# Patient Record
Sex: FEMALE | Race: WHITE | NOT HISPANIC OR LATINO | Employment: UNEMPLOYED
[De-identification: names, ages, dates, MRNs, and addresses within clinical notes are randomized per-mention and may not be internally consistent; named-entity substitution may affect disease eponyms.]

---

## 2017-04-21 ENCOUNTER — RECORDS - HEALTHEAST (OUTPATIENT)
Dept: ADMINISTRATIVE | Facility: OTHER | Age: 27
End: 2017-04-21

## 2017-06-24 ENCOUNTER — HEALTH MAINTENANCE LETTER (OUTPATIENT)
Age: 27
End: 2017-06-24

## 2017-08-28 ENCOUNTER — AMBULATORY - HEALTHEAST (OUTPATIENT)
Dept: SURGERY | Facility: CLINIC | Age: 27
End: 2017-08-28

## 2017-08-28 ENCOUNTER — OFFICE VISIT - HEALTHEAST (OUTPATIENT)
Dept: SURGERY | Facility: CLINIC | Age: 27
End: 2017-08-28

## 2017-08-28 DIAGNOSIS — E66.01 OBESITY, CLASS III, BMI 40-49.9 (MORBID OBESITY) (H): ICD-10-CM

## 2017-08-28 DIAGNOSIS — F32.A DEPRESSION: ICD-10-CM

## 2017-08-28 ASSESSMENT — MIFFLIN-ST. JEOR: SCORE: 1954.67

## 2017-09-07 ENCOUNTER — OFFICE VISIT - HEALTHEAST (OUTPATIENT)
Dept: FAMILY MEDICINE | Facility: CLINIC | Age: 27
End: 2017-09-07

## 2017-09-07 DIAGNOSIS — E66.01 OBESITY, CLASS III, BMI 40-49.9 (MORBID OBESITY) (H): ICD-10-CM

## 2017-09-07 DIAGNOSIS — Z00.00 HEALTH CARE MAINTENANCE: ICD-10-CM

## 2017-09-07 LAB
CHOLEST SERPL-MCNC: 152 MG/DL
FASTING STATUS PATIENT QL REPORTED: NO
HBA1C MFR BLD: 5.1 % (ref 3.5–6)
HDLC SERPL-MCNC: 36 MG/DL
LDLC SERPL CALC-MCNC: 99 MG/DL
TRIGL SERPL-MCNC: 86 MG/DL

## 2017-09-07 ASSESSMENT — MIFFLIN-ST. JEOR: SCORE: 1949.84

## 2017-09-08 ENCOUNTER — COMMUNICATION - HEALTHEAST (OUTPATIENT)
Dept: FAMILY MEDICINE | Facility: CLINIC | Age: 27
End: 2017-09-08

## 2017-09-08 ENCOUNTER — COMMUNICATION - HEALTHEAST (OUTPATIENT)
Dept: SURGERY | Facility: CLINIC | Age: 27
End: 2017-09-08

## 2017-09-08 DIAGNOSIS — E03.9 HYPOTHYROIDISM: ICD-10-CM

## 2017-09-08 LAB — HBV SURFACE AB SERPL IA-ACNC: POSITIVE M[IU]/ML

## 2017-09-25 ENCOUNTER — OFFICE VISIT - HEALTHEAST (OUTPATIENT)
Dept: SURGERY | Facility: CLINIC | Age: 27
End: 2017-09-25

## 2017-09-25 DIAGNOSIS — Z71.3 NUTRITIONAL COUNSELING: ICD-10-CM

## 2017-09-25 DIAGNOSIS — E66.01 OBESITY, MORBID, BMI 40.0-49.9 (H): ICD-10-CM

## 2017-09-25 ASSESSMENT — MIFFLIN-ST. JEOR: SCORE: 1909.31

## 2017-09-27 ENCOUNTER — RECORDS - HEALTHEAST (OUTPATIENT)
Dept: ADMINISTRATIVE | Facility: OTHER | Age: 27
End: 2017-09-27

## 2017-09-27 ENCOUNTER — OFFICE VISIT - HEALTHEAST (OUTPATIENT)
Dept: FAMILY MEDICINE | Facility: CLINIC | Age: 27
End: 2017-09-27

## 2017-09-27 ENCOUNTER — COMMUNICATION - HEALTHEAST (OUTPATIENT)
Dept: FAMILY MEDICINE | Facility: CLINIC | Age: 27
End: 2017-09-27

## 2017-09-27 DIAGNOSIS — Z11.3 SCREEN FOR STD (SEXUALLY TRANSMITTED DISEASE): ICD-10-CM

## 2017-09-27 DIAGNOSIS — A59.01 TRICHOMONAS VAGINITIS: ICD-10-CM

## 2017-09-27 DIAGNOSIS — N89.8 VAGINAL DISCHARGE: ICD-10-CM

## 2017-09-27 DIAGNOSIS — E03.9 HYPOTHYROIDISM: ICD-10-CM

## 2017-09-27 LAB — HIV 1+2 AB+HIV1 P24 AG SERPL QL IA: NEGATIVE

## 2017-09-28 LAB — SYPHILIS RPR SCREEN - HISTORICAL: NORMAL

## 2017-09-29 ENCOUNTER — AMBULATORY - HEALTHEAST (OUTPATIENT)
Dept: SURGERY | Facility: CLINIC | Age: 27
End: 2017-09-29

## 2017-09-29 ENCOUNTER — COMMUNICATION - HEALTHEAST (OUTPATIENT)
Dept: FAMILY MEDICINE | Facility: CLINIC | Age: 27
End: 2017-09-29

## 2017-10-02 ENCOUNTER — COMMUNICATION - HEALTHEAST (OUTPATIENT)
Dept: SURGERY | Facility: CLINIC | Age: 27
End: 2017-10-02

## 2017-10-02 ENCOUNTER — COMMUNICATION - HEALTHEAST (OUTPATIENT)
Dept: FAMILY MEDICINE | Facility: CLINIC | Age: 27
End: 2017-10-02

## 2017-10-23 ENCOUNTER — COMMUNICATION - HEALTHEAST (OUTPATIENT)
Dept: SURGERY | Facility: CLINIC | Age: 27
End: 2017-10-23

## 2017-11-13 ENCOUNTER — OFFICE VISIT - HEALTHEAST (OUTPATIENT)
Dept: SURGERY | Facility: CLINIC | Age: 27
End: 2017-11-13

## 2017-11-13 DIAGNOSIS — E66.01 OBESITY, CLASS III, BMI 40-49.9 (MORBID OBESITY) (H): ICD-10-CM

## 2017-11-13 ASSESSMENT — MIFFLIN-ST. JEOR: SCORE: 1929.72

## 2017-12-01 ENCOUNTER — OFFICE VISIT - HEALTHEAST (OUTPATIENT)
Dept: SURGERY | Facility: CLINIC | Age: 27
End: 2017-12-01

## 2017-12-01 DIAGNOSIS — Z71.3 NUTRITIONAL COUNSELING: ICD-10-CM

## 2017-12-01 DIAGNOSIS — E66.01 OBESITY, MORBID, BMI 40.0-49.9 (H): ICD-10-CM

## 2017-12-01 ASSESSMENT — MIFFLIN-ST. JEOR: SCORE: 1900.23

## 2017-12-18 ENCOUNTER — OFFICE VISIT - HEALTHEAST (OUTPATIENT)
Dept: SURGERY | Facility: CLINIC | Age: 27
End: 2017-12-18

## 2017-12-18 DIAGNOSIS — K59.00 CONSTIPATION: ICD-10-CM

## 2017-12-18 DIAGNOSIS — E66.01 OBESITY, CLASS III, BMI 40-49.9 (MORBID OBESITY) (H): ICD-10-CM

## 2017-12-18 ASSESSMENT — MIFFLIN-ST. JEOR: SCORE: 1882.09

## 2017-12-21 ENCOUNTER — AMBULATORY - HEALTHEAST (OUTPATIENT)
Dept: SURGERY | Facility: CLINIC | Age: 27
End: 2017-12-21

## 2017-12-21 DIAGNOSIS — E66.01 OBESITY, CLASS III, BMI 40-49.9 (MORBID OBESITY) (H): ICD-10-CM

## 2018-01-05 ENCOUNTER — COMMUNICATION - HEALTHEAST (OUTPATIENT)
Dept: SURGERY | Facility: CLINIC | Age: 28
End: 2018-01-05

## 2018-01-22 ENCOUNTER — OFFICE VISIT - HEALTHEAST (OUTPATIENT)
Dept: SURGERY | Facility: CLINIC | Age: 28
End: 2018-01-22

## 2018-01-22 DIAGNOSIS — R63.4 WEIGHT LOSS OF MORE THAN 10% BODY WEIGHT: ICD-10-CM

## 2018-01-22 DIAGNOSIS — Z71.3 NUTRITIONAL COUNSELING: ICD-10-CM

## 2018-01-22 DIAGNOSIS — R79.89 LOW VITAMIN D LEVEL: ICD-10-CM

## 2018-01-22 DIAGNOSIS — E03.9 HYPOTHYROID: ICD-10-CM

## 2018-01-22 DIAGNOSIS — Z71.3 WEIGHT LOSS COUNSELING, ENCOUNTER FOR: ICD-10-CM

## 2018-01-22 DIAGNOSIS — E66.01 OBESITY, MORBID, BMI 40.0-49.9 (H): ICD-10-CM

## 2018-01-22 ASSESSMENT — MIFFLIN-ST. JEOR
SCORE: 1845.8
SCORE: 1845.8

## 2018-02-14 ENCOUNTER — COMMUNICATION - HEALTHEAST (OUTPATIENT)
Dept: FAMILY MEDICINE | Facility: CLINIC | Age: 28
End: 2018-02-14

## 2018-02-14 ENCOUNTER — OFFICE VISIT - HEALTHEAST (OUTPATIENT)
Dept: FAMILY MEDICINE | Facility: CLINIC | Age: 28
End: 2018-02-14

## 2018-02-14 DIAGNOSIS — Z11.3 SCREEN FOR STD (SEXUALLY TRANSMITTED DISEASE): ICD-10-CM

## 2018-02-14 DIAGNOSIS — R79.89 LOW VITAMIN D LEVEL: ICD-10-CM

## 2018-02-14 DIAGNOSIS — E03.9 HYPOTHYROID: ICD-10-CM

## 2018-02-14 LAB
CLUE CELLS: NORMAL
TRICHOMONAS, WET PREP: NORMAL
TSH SERPL DL<=0.005 MIU/L-ACNC: 3.23 UIU/ML (ref 0.3–5)
YEAST, WET PREP: NORMAL

## 2018-02-14 ASSESSMENT — MIFFLIN-ST. JEOR: SCORE: 1859.41

## 2018-02-15 ENCOUNTER — COMMUNICATION - HEALTHEAST (OUTPATIENT)
Dept: FAMILY MEDICINE | Facility: CLINIC | Age: 28
End: 2018-02-15

## 2018-02-15 ENCOUNTER — COMMUNICATION - HEALTHEAST (OUTPATIENT)
Dept: SURGERY | Facility: CLINIC | Age: 28
End: 2018-02-15

## 2018-02-15 LAB
25(OH)D3 SERPL-MCNC: 24.3 NG/ML (ref 30–80)
C TRACH DNA SPEC QL PROBE+SIG AMP: NEGATIVE
N GONORRHOEA DNA SPEC QL NAA+PROBE: NEGATIVE

## 2018-02-21 ENCOUNTER — HOSPITAL ENCOUNTER (EMERGENCY)
Facility: CLINIC | Age: 28
Discharge: HOME | End: 2018-02-21
Attending: NURSE PRACTITIONER | Admitting: NURSE PRACTITIONER
Payer: COMMERCIAL

## 2018-02-21 VITALS
RESPIRATION RATE: 14 BRPM | OXYGEN SATURATION: 95 % | TEMPERATURE: 98.5 F | HEART RATE: 82 BPM | WEIGHT: 250 LBS | SYSTOLIC BLOOD PRESSURE: 131 MMHG | DIASTOLIC BLOOD PRESSURE: 92 MMHG

## 2018-02-21 DIAGNOSIS — N30.00 ACUTE CYSTITIS WITHOUT HEMATURIA: ICD-10-CM

## 2018-02-21 LAB
ALBUMIN UR-MCNC: NEGATIVE MG/DL
APPEARANCE UR: ABNORMAL
BACTERIA #/AREA URNS HPF: ABNORMAL /HPF
BILIRUB UR QL STRIP: NEGATIVE
COLOR UR AUTO: YELLOW
GLUCOSE UR STRIP-MCNC: NEGATIVE MG/DL
HCG UR QL: NEGATIVE
HGB UR QL STRIP: NEGATIVE
KETONES UR STRIP-MCNC: NEGATIVE MG/DL
LEUKOCYTE ESTERASE UR QL STRIP: ABNORMAL
MUCOUS THREADS #/AREA URNS LPF: PRESENT /LPF
NITRATE UR QL: NEGATIVE
PH UR STRIP: 6 PH (ref 5–7)
RBC #/AREA URNS AUTO: 7 /HPF (ref 0–2)
SOURCE: ABNORMAL
SP GR UR STRIP: 1.02 (ref 1–1.03)
SQUAMOUS #/AREA URNS AUTO: 6 /HPF (ref 0–1)
UROBILINOGEN UR STRIP-MCNC: 0 MG/DL (ref 0–2)
WBC #/AREA URNS AUTO: 177 /HPF (ref 0–2)
WBC CLUMPS #/AREA URNS HPF: PRESENT /HPF

## 2018-02-21 PROCEDURE — 81001 URINALYSIS AUTO W/SCOPE: CPT | Performed by: NURSE PRACTITIONER

## 2018-02-21 PROCEDURE — 87088 URINE BACTERIA CULTURE: CPT | Performed by: NURSE PRACTITIONER

## 2018-02-21 PROCEDURE — G0463 HOSPITAL OUTPT CLINIC VISIT: HCPCS

## 2018-02-21 PROCEDURE — 81025 URINE PREGNANCY TEST: CPT | Performed by: NURSE PRACTITIONER

## 2018-02-21 PROCEDURE — 87186 SC STD MICRODIL/AGAR DIL: CPT | Performed by: NURSE PRACTITIONER

## 2018-02-21 PROCEDURE — 87086 URINE CULTURE/COLONY COUNT: CPT | Performed by: NURSE PRACTITIONER

## 2018-02-21 PROCEDURE — 99213 OFFICE O/P EST LOW 20 MIN: CPT | Performed by: NURSE PRACTITIONER

## 2018-02-21 RX ORDER — SULFAMETHOXAZOLE/TRIMETHOPRIM 800-160 MG
1 TABLET ORAL 2 TIMES DAILY
Qty: 6 TABLET | Refills: 0 | Status: SHIPPED | OUTPATIENT
Start: 2018-02-21 | End: 2018-02-24

## 2018-02-21 NOTE — ED AVS SNAPSHOT
" [facility] Emergency Department    [address]    [facility] [address]    Phone:  [phone]    Fax:  [phone]                                       Stefanie Cordova   MRN: [id number]    Department:  [facility] Emergency Department   Date of Visit:  2/21/2018           Patient Information     Date Of Birth          1990        Your diagnoses for this visit were:     Acute cystitis without hematuria        You were seen by Lavonne Booth APRN CNP.      Follow-up Information     Follow up with Brittany Julien MD.    Specialty:  Pediatrics    Why:  As needed    Contact information:    [address] [place] [address]  [phone]          Discharge Instructions           *BLADDER INFECTION,Female (Adult)    A bladder infection (\"cystitis\" or \"UTI\") usually causes a constant urge to urinate and a burning when passing urine. Urine may be cloudy, smelly or dark. There may be pain in the lower abdomen. A bladder infection occurs when bacteria from the vaginal area enter the bladder opening (urethra). This can occur from sexual intercourse, wearing tight clothing, dehydration and other factors.  HOME CARE:  1. Drink lots of fluids (at least 6-8 glasses a day, unless you must restrict fluids for other medical reasons). This will force the medicine into your urinary system and flush the bacteria out of your body. Cranberry juice has been shown to help clear out the bacteria.  2. Avoid sexual intercourse until your symptoms are gone.  3. A bladder infection is treated with antibiotics. You may also be given Pyridium (generic = phenazopyridine) to reduce the burning sensation. This medicine will cause your urine to become a bright orange color. The orange urine may stain clothing. You may wear a pad or panty-liner to protect clothing.  PREVENTING FUTURE INFECTIONS:  1. Always wipe from front to back after a bowel movement.  2. Keep the genital area clean and " dry.  3. Drink plenty of fluids each day to avoid dehydration.  4. Urinate right after intercourse to flush out the bladder.  5. Wear cotton underwear and cotton-lined panty hose; avoid tight-fitting pants.  6. If you are on birth control pills and are having frequent bladder infections, discuss with your doctor.  FOLLOW UP: Return to this facility or see your doctor if ALL symptoms are not gone after three days of treatment.  GET PROMPT MEDICAL ATTENTION if any of the following occur:    Fever over 101 F (38.3 C)    No improvement by the third day of treatment    Increasing back or abdominal pain    Repeated vomiting; unable to keep medicine down    Weakness, dizziness or fainting    Vaginal discharge    Pain, redness or swelling in the labia (outer vaginal area)    6234-6892 [employer], [address] [place] [address], [place] [address]. All rights reserved. This information is not intended as a substitute for professional medical care. Always follow your healthcare professional's instructions.      24 Hour Appointment Hotline       To make an appointment at any [facility] call [phone] ([phone]). If you don't have a family doctor or clinic, we will help you find one. [facility] are conveniently located to serve the needs of you and your family.             Review of your medicines      START taking        Dose / Directions Last dose taken    sulfamethoxazole-trimethoprim 800-160 MG per tablet   Commonly known as:  BACTRIM DS   Dose:  1 tablet   Quantity:  6 tablet        Take 1 tablet by mouth 2 times daily for 3 days   Refills:  0                Prescriptions were sent or printed at these locations (1 Prescription)                   [facility] [address] [place] [address] - [address]    [address] [facility] [address]    Telephone:  [phone]   Fax:  [phone]   Hours:                  E-Prescribed (1 of 1)         sulfamethoxazole-trimethoprim (BACTRIM DS) 800-160 MG  per tablet                Procedures and tests performed during your visit     HCG qualitative urine    UA with Microscopic reflex to Culture      Orders Needing Specimen Collection     None      Pending Results     Date and Time Order Name Status Description    2/21/2018 1259 UA with Microscopic reflex to Culture In process             Pending Culture Results     Date and Time Order Name Status Description    2/21/2018 1259 UA with Microscopic reflex to Culture In process             Pending Results Instructions     If you had any lab results that were not finalized at the time of your Discharge, you can call the ED Lab Result RN at [phone]. You will be contacted by this team for any positive Lab results or changes in treatment. The nurses are available 7 days a week from 10A to 6:30P.  You can leave a message 24 hours per day and they will return your call.        Test Results From Your Hospital Stay        2/21/2018  1:55 PM      Component Results     Component Value Ref Range & Units Status    Color Urine Yellow  Final    Appearance Urine Slightly Cloudy  Final    Glucose Urine Negative NEG^Negative mg/dL Final    Bilirubin Urine Negative NEG^Negative Final    Ketones Urine Negative NEG^Negative mg/dL Final    Specific Gravity Urine 1.019 1.003 - 1.035 Final    Blood Urine Negative NEG^Negative Final    pH Urine 6.0 5.0 - 7.0 pH Final    Protein Albumin Urine Negative NEG^Negative mg/dL Final    Urobilinogen mg/dL 0.0 0.0 - 2.0 mg/dL Final    Nitrite Urine Negative NEG^Negative Final    Leukocyte Esterase Urine Moderate (A) NEG^Negative Final    Source Midstream Urine  Final    WBC Urine PENDING OTO2^O - 2 /HPF Incomplete    RBC Urine PENDING OTO2^O - 2 /HPF Incomplete         2/21/2018  1:20 PM      Component Results     Component Value Ref Range & Units Status    HCG Qual Urine Negative NEG^Negative Final    This test is for screening purposes.  Results should be interpreted along with   the clinical  "picture.  Confirmation testing is available if warranted by   ordering WQT438, HCG Quantitative Pregnancy.                  Thank you for choosing [facility]       Thank you for choosing [facility] for your care. Our goal is always to provide you with excellent care. Hearing back from our patients is one way we can continue to improve our services. Please take a few minutes to complete the written survey that you may receive in the mail after you visit with us. Thank you!        Acacia Livinghar[employer]ai Information     [employer] lets you send messages to your doctor, view your test results, renew your prescriptions, schedule appointments and more. To sign up, go to [email or web address] . Click on \"Log in\" on the left side of the screen, which will take you to the Welcome page. Then click on \"Sign up Now\" on the right side of the page.     You will be asked to enter the access code listed below, as well as some personal information. Please follow the directions to create your username and password.     Your access code is: [email or web address]  Expires: 2018  2:07 PM     Your access code will  in 90 days. If you need help or a new code, please call your [facility] clinic or [phone].        Care EveryWhere ID     This is your Care EveryWhere ID. This could be used by other organizations to access your [facility] medical records  [id number]        Equal Access to Services     GILL WHITAKER : Hadnegro Johnson, waaxda luqadaha, qaybta kaalmada aderamone, stefany tomas . So [facility] [phone].    ATENCIÓN: Si habla español, tiene a spear disposición servicios gratuitos de asistencia lingüística. Llame al [phone].    We comply with applicable federal civil rights laws and [place] laws. We do not discriminate on the basis of race, color, national origin, age, disability, sex, sexual orientation, or gender identity.            After Visit Summary       This is your record. Keep this with you and " show to your community pharmacist(s) and doctor(s) at your next visit.

## 2018-02-21 NOTE — ED PROVIDER NOTES
History     Chief Complaint   Patient presents with     UTI     concerned for UTI. Burning/itching with urination. Now painful urination.      HPI  Stefanie Cordova is a 27 year old female who presented to urgent care for evaluation of dysuria.  Symptoms started 3 days ago.  Denies fever.  Denies nausea or vomiting.  Denies abdominal pain.  Denies flank pain.  No history of frequent UTIs.  No history of kidney stones.    Problem List:    There are no active problems to display for this patient.       Past Medical History:    History reviewed. No pertinent past medical history.    Past Surgical History:    History reviewed. No pertinent surgical history.    Family History:    No family history on file.    Social History:  Marital Status:  Single [1]  Social History   Substance Use Topics     Smoking status: Never Smoker     Smokeless tobacco: Never Used     Alcohol use Not on file        Medications:      sulfamethoxazole-trimethoprim (BACTRIM DS) 800-160 MG per tablet         Review of Systems  As mentioned above in the history present illness. All other systems were reviewed and are negative.      Physical Exam   BP: (!) 131/92  Pulse: 82  Temp: 98.5  F (36.9  C)  Resp: 14  Weight: 113.4 kg (250 lb)  SpO2: 95 %      Physical Exam    GENERAL APPEARANCE: healthy, alert and no distress  RESP: lungs clear to auscultation - no rales, rhonchi or wheezes  CV: regular rates and rhythm, normal S1 S2, no murmur noted    ED Course     ED Course     Procedures          Results for orders placed or performed during the hospital encounter of 02/21/18 (from the past 48 hour(s))   UA with Microscopic reflex to Culture   Result Value Ref Range    Color Urine Yellow     Appearance Urine Slightly Cloudy     Glucose Urine Negative NEG^Negative mg/dL    Bilirubin Urine Negative NEG^Negative    Ketones Urine Negative NEG^Negative mg/dL    Specific Gravity Urine 1.019 1.003 - 1.035    Blood Urine Negative NEG^Negative    pH Urine 6.0  5.0 - 7.0 pH    Protein Albumin Urine Negative NEG^Negative mg/dL    Urobilinogen mg/dL 0.0 0.0 - 2.0 mg/dL    Nitrite Urine Negative NEG^Negative    Leukocyte Esterase Urine Moderate (A) NEG^Negative    Source Midstream Urine     WBC Urine 177 (H) 0 - 2 /HPF    RBC Urine 7 (H) 0 - 2 /HPF    WBC Clumps Present (A) NEG^Negative /HPF    Bacteria Urine Few (A) NEG^Negative /HPF    Squamous Epithelial /HPF Urine 6 (H) 0 - 1 /HPF    Mucous Urine Present (A) NEG^Negative /LPF   HCG qualitative urine   Result Value Ref Range    HCG Qual Urine Negative NEG^Negative       Assessments & Plan (with Medical Decision Making)       Urinalysis positive for infection.  No signs or symptoms concerning for pyelonephritis or nephrolithiasis at this time.  Patient will be discharged home stable on bactrim pending urine culture results from today's visit.  She was instructed to follow up with PCP if no improvement in three days.  Worrisome reasons to seek care sooner discussed.      I have reviewed the nursing notes.    I have reviewed the findings, diagnosis, plan and need for follow up with the patient.      Discharge Medication List as of 2/21/2018  2:07 PM      START taking these medications    Details   sulfamethoxazole-trimethoprim (BACTRIM DS) 800-160 MG per tablet Take 1 tablet by mouth 2 times daily for 3 days, Disp-6 tablet, R-0, E-Prescribe             Final diagnoses:   Acute cystitis without hematuria       2/21/2018   [facility] EMERGENCY DEPARTMENT     Emmy, SELVIN Villa CNP  02/21/18 1700

## 2018-02-21 NOTE — DISCHARGE INSTRUCTIONS
"    *BLADDER INFECTION,Female (Adult)    A bladder infection (\"cystitis\" or \"UTI\") usually causes a constant urge to urinate and a burning when passing urine. Urine may be cloudy, smelly or dark. There may be pain in the lower abdomen. A bladder infection occurs when bacteria from the vaginal area enter the bladder opening (urethra). This can occur from sexual intercourse, wearing tight clothing, dehydration and other factors.  HOME CARE:  1. Drink lots of fluids (at least 6-8 glasses a day, unless you must restrict fluids for other medical reasons). This will force the medicine into your urinary system and flush the bacteria out of your body. Cranberry juice has been shown to help clear out the bacteria.  2. Avoid sexual intercourse until your symptoms are gone.  3. A bladder infection is treated with antibiotics. You may also be given Pyridium (generic = phenazopyridine) to reduce the burning sensation. This medicine will cause your urine to become a bright orange color. The orange urine may stain clothing. You may wear a pad or panty-liner to protect clothing.  PREVENTING FUTURE INFECTIONS:  1. Always wipe from front to back after a bowel movement.  2. Keep the genital area clean and dry.  3. Drink plenty of fluids each day to avoid dehydration.  4. Urinate right after intercourse to flush out the bladder.  5. Wear cotton underwear and cotton-lined panty hose; avoid tight-fitting pants.  6. If you are on birth control pills and are having frequent bladder infections, discuss with your doctor.  FOLLOW UP: Return to this facility or see your doctor if ALL symptoms are not gone after three days of treatment.  GET PROMPT MEDICAL ATTENTION if any of the following occur:    Fever over 101 F (38.3 C)    No improvement by the third day of treatment    Increasing back or abdominal pain    Repeated vomiting; unable to keep medicine down    Weakness, dizziness or fainting    Vaginal discharge    Pain, redness or swelling in " the labia (outer vaginal area)    3783-9461 [employer], [address] [place] [address], [place] [address]. All rights reserved. This information is not intended as a substitute for professional medical care. Always follow your healthcare professional's instructions.

## 2018-02-21 NOTE — ED NOTES
Patient here for UTI, symptoms started 4 days ago.  Patient presents ambualtory to the urgent care.  UA ordered per protocol.

## 2018-02-21 NOTE — ED AVS SNAPSHOT
[facility] Emergency Department    [address] [address]    Phone:  [phone]    Fax:  [phone]                                       Stefanie Cordova   MRN: [id number]    Department:  [facility] Emergency Department   Date of Visit:  2/21/2018           After Visit Summary Signature Page     I have received my discharge instructions, and my questions have been answered. I have discussed any challenges I see with this plan with the nurse or doctor.    ..........................................................................................................................................  Patient/Patient Representative Signature      ..........................................................................................................................................  Patient Representative Print Name and Relationship to Patient    ..................................................               ................................................  Date                                            Time    ..........................................................................................................................................  Reviewed by Signature/Title    ...................................................              ..............................................  Date                                                            Time

## 2018-02-24 LAB
BACTERIA SPEC CULT: ABNORMAL
BACTERIA SPEC CULT: ABNORMAL
Lab: ABNORMAL
SPECIMEN SOURCE: ABNORMAL

## 2018-09-06 ENCOUNTER — OFFICE VISIT - HEALTHEAST (OUTPATIENT)
Dept: SURGERY | Facility: CLINIC | Age: 28
End: 2018-09-06

## 2018-09-06 DIAGNOSIS — R63.5 WEIGHT GAIN: ICD-10-CM

## 2018-09-06 DIAGNOSIS — R79.89 LOW VITAMIN D LEVEL: ICD-10-CM

## 2018-09-06 DIAGNOSIS — E66.01 MORBID OBESITY WITH BMI OF 50.0-59.9, ADULT (H): ICD-10-CM

## 2018-09-06 DIAGNOSIS — Z56.0 LOSS OF JOB: ICD-10-CM

## 2018-09-06 SDOH — ECONOMIC STABILITY - INCOME SECURITY: UNEMPLOYMENT, UNSPECIFIED: Z56.0

## 2018-09-06 ASSESSMENT — MIFFLIN-ST. JEOR: SCORE: 1990.95

## 2018-09-07 ENCOUNTER — AMBULATORY - HEALTHEAST (OUTPATIENT)
Dept: SURGERY | Facility: CLINIC | Age: 28
End: 2018-09-07

## 2018-09-18 ENCOUNTER — AMBULATORY - HEALTHEAST (OUTPATIENT)
Dept: SURGERY | Facility: CLINIC | Age: 28
End: 2018-09-18

## 2018-09-20 ENCOUNTER — OFFICE VISIT - HEALTHEAST (OUTPATIENT)
Dept: SURGERY | Facility: CLINIC | Age: 28
End: 2018-09-20

## 2018-09-20 DIAGNOSIS — E66.01 MORBID OBESITY WITH BODY MASS INDEX (BMI) OF 50.0 TO 59.9 IN ADULT (H): ICD-10-CM

## 2018-09-27 ENCOUNTER — COMMUNICATION - HEALTHEAST (OUTPATIENT)
Dept: SURGERY | Facility: CLINIC | Age: 28
End: 2018-09-27

## 2018-10-04 ENCOUNTER — OFFICE VISIT - HEALTHEAST (OUTPATIENT)
Dept: SURGERY | Facility: CLINIC | Age: 28
End: 2018-10-04

## 2018-10-04 DIAGNOSIS — E66.01 MORBID OBESITY WITH BMI OF 50.0-59.9, ADULT (H): ICD-10-CM

## 2018-10-09 ENCOUNTER — OFFICE VISIT - HEALTHEAST (OUTPATIENT)
Dept: SURGERY | Facility: CLINIC | Age: 28
End: 2018-10-09

## 2018-10-09 ENCOUNTER — AMBULATORY - HEALTHEAST (OUTPATIENT)
Dept: SURGERY | Facility: CLINIC | Age: 28
End: 2018-10-09

## 2018-10-09 DIAGNOSIS — Z71.3 NUTRITIONAL COUNSELING: ICD-10-CM

## 2018-10-09 DIAGNOSIS — E07.9 THYROID DYSFUNCTION: ICD-10-CM

## 2018-10-09 DIAGNOSIS — E66.01 OBESITY, MORBID, BMI 50 OR HIGHER (H): ICD-10-CM

## 2018-10-09 ASSESSMENT — MIFFLIN-ST. JEOR: SCORE: 2016.81

## 2018-11-05 ENCOUNTER — OFFICE VISIT - HEALTHEAST (OUTPATIENT)
Dept: SURGERY | Facility: CLINIC | Age: 28
End: 2018-11-05

## 2018-11-05 DIAGNOSIS — E07.9 THYROID DYSFUNCTION: ICD-10-CM

## 2018-11-05 DIAGNOSIS — E66.01 OBESITY, MORBID, BMI 50 OR HIGHER (H): ICD-10-CM

## 2018-11-05 DIAGNOSIS — Z71.3 NUTRITIONAL COUNSELING: ICD-10-CM

## 2018-11-05 ASSESSMENT — MIFFLIN-ST. JEOR: SCORE: 1981.88

## 2018-11-08 ENCOUNTER — AMBULATORY - HEALTHEAST (OUTPATIENT)
Dept: LAB | Facility: CLINIC | Age: 28
End: 2018-11-08

## 2018-11-08 ENCOUNTER — OFFICE VISIT - HEALTHEAST (OUTPATIENT)
Dept: SURGERY | Facility: CLINIC | Age: 28
End: 2018-11-08

## 2018-11-08 DIAGNOSIS — E66.01 MORBID OBESITY WITH BMI OF 50.0-59.9, ADULT (H): ICD-10-CM

## 2018-11-08 DIAGNOSIS — Z56.0 LOSS OF JOB: ICD-10-CM

## 2018-11-08 DIAGNOSIS — R79.89 LOW VITAMIN D LEVEL: ICD-10-CM

## 2018-11-08 LAB
ALBUMIN SERPL-MCNC: 3.3 G/DL (ref 3.5–5)
ALP SERPL-CCNC: 66 U/L (ref 45–120)
ALT SERPL W P-5'-P-CCNC: 14 U/L (ref 0–45)
ANION GAP SERPL CALCULATED.3IONS-SCNC: 9 MMOL/L (ref 5–18)
AST SERPL W P-5'-P-CCNC: 15 U/L (ref 0–40)
BILIRUB SERPL-MCNC: 0.3 MG/DL (ref 0–1)
BUN SERPL-MCNC: 12 MG/DL (ref 8–22)
CALCIUM SERPL-MCNC: 9.1 MG/DL (ref 8.5–10.5)
CHLORIDE BLD-SCNC: 111 MMOL/L (ref 98–107)
CHOLEST SERPL-MCNC: 172 MG/DL
CO2 SERPL-SCNC: 22 MMOL/L (ref 22–31)
CREAT SERPL-MCNC: 0.82 MG/DL (ref 0.6–1.1)
ERYTHROCYTE [DISTWIDTH] IN BLOOD BY AUTOMATED COUNT: 12.6 % (ref 11–14.5)
FASTING STATUS PATIENT QL REPORTED: YES
FERRITIN SERPL-MCNC: 21 NG/ML (ref 10–130)
GFR SERPL CREATININE-BSD FRML MDRD: >60 ML/MIN/1.73M2
GLUCOSE BLD-MCNC: 84 MG/DL (ref 70–125)
HBA1C MFR BLD: 5.1 % (ref 3.5–6)
HCT VFR BLD AUTO: 36.6 % (ref 35–47)
HDLC SERPL-MCNC: 43 MG/DL
HGB BLD-MCNC: 12.6 G/DL (ref 12–16)
LDLC SERPL CALC-MCNC: 112 MG/DL
MCH RBC QN AUTO: 28.9 PG (ref 27–34)
MCHC RBC AUTO-ENTMCNC: 34.4 G/DL (ref 32–36)
MCV RBC AUTO: 84 FL (ref 80–100)
PLATELET # BLD AUTO: 349 THOU/UL (ref 140–440)
PMV BLD AUTO: 6.3 FL (ref 7–10)
POTASSIUM BLD-SCNC: 4.5 MMOL/L (ref 3.5–5)
PROT SERPL-MCNC: 6 G/DL (ref 6–8)
RBC # BLD AUTO: 4.35 MILL/UL (ref 3.8–5.4)
SODIUM SERPL-SCNC: 142 MMOL/L (ref 136–145)
TRIGL SERPL-MCNC: 83 MG/DL
TSH SERPL DL<=0.005 MIU/L-ACNC: 5.59 UIU/ML (ref 0.3–5)
VIT B12 SERPL-MCNC: 417 PG/ML (ref 213–816)
WBC: 6.2 THOU/UL (ref 4–11)

## 2018-11-08 SDOH — ECONOMIC STABILITY - INCOME SECURITY: UNEMPLOYMENT, UNSPECIFIED: Z56.0

## 2018-11-08 ASSESSMENT — MIFFLIN-ST. JEOR: SCORE: 1981.88

## 2018-11-09 ENCOUNTER — COMMUNICATION - HEALTHEAST (OUTPATIENT)
Dept: SURGERY | Facility: CLINIC | Age: 28
End: 2018-11-09

## 2018-11-09 DIAGNOSIS — F34.1 DYSTHYMIA: ICD-10-CM

## 2018-11-09 DIAGNOSIS — E03.9 HYPOTHYROID: ICD-10-CM

## 2018-11-09 LAB — 25(OH)D3 SERPL-MCNC: 26.9 NG/ML (ref 30–80)

## 2018-11-13 ENCOUNTER — AMBULATORY - HEALTHEAST (OUTPATIENT)
Dept: SURGERY | Facility: CLINIC | Age: 28
End: 2018-11-13

## 2018-11-16 ENCOUNTER — RECORDS - HEALTHEAST (OUTPATIENT)
Dept: ADMINISTRATIVE | Facility: OTHER | Age: 28
End: 2018-11-16

## 2018-11-16 ENCOUNTER — AMBULATORY - HEALTHEAST (OUTPATIENT)
Dept: SURGERY | Facility: CLINIC | Age: 28
End: 2018-11-16

## 2018-11-16 DIAGNOSIS — K21.9 ACID REFLUX: ICD-10-CM

## 2018-11-16 DIAGNOSIS — Z98.84 S/P BARIATRIC SURGERY: ICD-10-CM

## 2018-11-16 DIAGNOSIS — Z01.818 PRE-OP TESTING: ICD-10-CM

## 2018-11-16 DIAGNOSIS — R63.4 RAPID WEIGHT LOSS: ICD-10-CM

## 2018-11-19 ENCOUNTER — AMBULATORY - HEALTHEAST (OUTPATIENT)
Dept: SURGERY | Facility: CLINIC | Age: 28
End: 2018-11-19

## 2018-12-06 ENCOUNTER — AMBULATORY - HEALTHEAST (OUTPATIENT)
Dept: SURGERY | Facility: CLINIC | Age: 28
End: 2018-12-06

## 2018-12-10 ENCOUNTER — ANESTHESIA - HEALTHEAST (OUTPATIENT)
Dept: SURGERY | Facility: CLINIC | Age: 28
End: 2018-12-10

## 2018-12-11 ENCOUNTER — SURGERY - HEALTHEAST (OUTPATIENT)
Dept: SURGERY | Facility: CLINIC | Age: 28
End: 2018-12-11

## 2018-12-11 ASSESSMENT — MIFFLIN-ST. JEOR
SCORE: 1946.95
SCORE: 1938.79

## 2018-12-13 ENCOUNTER — COMMUNICATION - HEALTHEAST (OUTPATIENT)
Dept: SURGERY | Facility: CLINIC | Age: 28
End: 2018-12-13

## 2018-12-17 ENCOUNTER — AMBULATORY - HEALTHEAST (OUTPATIENT)
Dept: SURGERY | Facility: CLINIC | Age: 28
End: 2018-12-17

## 2018-12-17 DIAGNOSIS — Z71.3 DIETARY COUNSELING: ICD-10-CM

## 2018-12-17 DIAGNOSIS — Z98.84 S/P BARIATRIC SURGERY: ICD-10-CM

## 2018-12-17 DIAGNOSIS — E66.01 OBESITY, MORBID, BMI 40.0-49.9 (H): ICD-10-CM

## 2018-12-27 ENCOUNTER — OFFICE VISIT - HEALTHEAST (OUTPATIENT)
Dept: SURGERY | Facility: CLINIC | Age: 28
End: 2018-12-27

## 2018-12-27 DIAGNOSIS — Z98.84 S/P BARIATRIC SURGERY: ICD-10-CM

## 2018-12-27 ASSESSMENT — MIFFLIN-ST. JEOR: SCORE: 1907.04

## 2019-06-28 ENCOUNTER — COMMUNICATION - HEALTHEAST (OUTPATIENT)
Dept: SURGERY | Facility: CLINIC | Age: 29
End: 2019-06-28

## 2019-06-28 DIAGNOSIS — Z98.84 S/P BARIATRIC SURGERY: ICD-10-CM

## 2019-06-28 DIAGNOSIS — K90.9 INTESTINAL MALABSORPTION, UNSPECIFIED: ICD-10-CM

## 2019-06-28 DIAGNOSIS — K91.2 POSTSURGICAL MALABSORPTION: ICD-10-CM

## 2019-07-10 ENCOUNTER — COMMUNICATION - HEALTHEAST (OUTPATIENT)
Dept: SURGERY | Facility: CLINIC | Age: 29
End: 2019-07-10

## 2019-11-24 ENCOUNTER — HOSPITAL ENCOUNTER (EMERGENCY)
Facility: CLINIC | Age: 29
Discharge: HOME | End: 2019-11-24
Attending: PSYCHIATRY & NEUROLOGY | Admitting: PSYCHIATRY & NEUROLOGY
Payer: COMMERCIAL

## 2019-11-24 VITALS
HEART RATE: 72 BPM | SYSTOLIC BLOOD PRESSURE: 136 MMHG | DIASTOLIC BLOOD PRESSURE: 85 MMHG | OXYGEN SATURATION: 100 % | BODY MASS INDEX: 32.71 KG/M2 | TEMPERATURE: 97.6 F | RESPIRATION RATE: 18 BRPM | WEIGHT: 191.6 LBS | HEIGHT: 64 IN

## 2019-11-24 DIAGNOSIS — F19.10 POLYSUBSTANCE ABUSE (H): ICD-10-CM

## 2019-11-24 DIAGNOSIS — Z59.00 HOMELESS: ICD-10-CM

## 2019-11-24 LAB
AMPHETAMINES UR QL SCN: POSITIVE
BARBITURATES UR QL: NEGATIVE
BENZODIAZ UR QL: NEGATIVE
CANNABINOIDS UR QL SCN: POSITIVE
COCAINE UR QL: NEGATIVE
ETHANOL UR QL SCN: NEGATIVE
HCG UR QL: NEGATIVE
OPIATES UR QL SCN: NEGATIVE

## 2019-11-24 PROCEDURE — 81025 URINE PREGNANCY TEST: CPT | Performed by: PSYCHIATRY & NEUROLOGY

## 2019-11-24 PROCEDURE — 90791 PSYCH DIAGNOSTIC EVALUATION: CPT

## 2019-11-24 PROCEDURE — 80320 DRUG SCREEN QUANTALCOHOLS: CPT | Performed by: PSYCHIATRY & NEUROLOGY

## 2019-11-24 PROCEDURE — 99284 EMERGENCY DEPT VISIT MOD MDM: CPT | Mod: Z6 | Performed by: PSYCHIATRY & NEUROLOGY

## 2019-11-24 PROCEDURE — 80307 DRUG TEST PRSMV CHEM ANLYZR: CPT | Performed by: PSYCHIATRY & NEUROLOGY

## 2019-11-24 PROCEDURE — 99285 EMERGENCY DEPT VISIT HI MDM: CPT | Mod: 25 | Performed by: PSYCHIATRY & NEUROLOGY

## 2019-11-24 SDOH — ECONOMIC STABILITY - HOUSING INSECURITY: HOMELESSNESS UNSPECIFIED: Z59.00

## 2019-11-24 ASSESSMENT — ENCOUNTER SYMPTOMS
DECREASED CONCENTRATION: 1
ACTIVITY CHANGE: 1
GASTROINTESTINAL NEGATIVE: 1
RESPIRATORY NEGATIVE: 1
CARDIOVASCULAR NEGATIVE: 1
HYPERACTIVE: 0
NERVOUS/ANXIOUS: 1
SLEEP DISTURBANCE: 1
MUSCULOSKELETAL NEGATIVE: 1
HALLUCINATIONS: 0
NEUROLOGICAL NEGATIVE: 1

## 2019-11-24 ASSESSMENT — MIFFLIN-ST. JEOR: SCORE: 1584.09

## 2019-11-24 NOTE — ED AVS SNAPSHOT
[facility] [place], Emergency Department  [address]  [facility] [address]  Phone:  [phone]  Fax:  [phone]                                    Stefanie Cordova   MRN: [id number]    Department:  [facility], Emergency Department   Date of Visit:  11/24/2019           After Visit Summary Signature Page    I have received my discharge instructions, and my questions have been answered. I have discussed any challenges I see with this plan with the nurse or doctor.    ..........................................................................................................................................  Patient/Patient Representative Signature      ..........................................................................................................................................  Patient Representative Print Name and Relationship to Patient    ..................................................               ................................................  Date                                   Time    ..........................................................................................................................................  Reviewed by Signature/Title    ...................................................              ..............................................  Date                                               Time          22EPIC Rev 08/18

## 2019-11-25 NOTE — ED TRIAGE NOTES
Marijuana and Adderrall abuse.  Last use two days ago.  Pt here with SO that is also seeking substance help.

## 2019-11-25 NOTE — ED PROVIDER NOTES
History     Chief Complaint   Patient presents with     Addiction Problem     Substance abuse, uses marijuana and Adderrall.  Last use two days ago.     The history is provided by the patient.     Stefanie Cordova is a 29 year old female who is here accompanied by mother and brother. Patient reports abusing Adderall past several months and it has lead to problems with her family. She has burned all her bridges and now is homeless. She and her boyfriend have been staying on the streets or shelters. She is tired of being homeless. She is interested in getting into treatment. She reports history of being in CD treatment as an adolescent. She reports having hallucinations when she abuses Adderall. She presently does not exhibit psychosis or paranoia. There is no suicidal thinking.    Patient was [facility] [place] [facility] [place] for URI concern. She has no further concerns regarding this.    Please see DEC Crisis Assessment on 11/24/19 in Epic for further details.    PERSONAL MEDICAL HISTORY  History reviewed. No pertinent past medical history.  PAST SURGICAL HISTORY  Past Surgical History:   Procedure Laterality Date     GI SURGERY  12/11/2018    Gastric sleeve     GYN SURGERY  03/19/2015    Tubal     FAMILY HISTORY  History reviewed. No pertinent family history.  SOCIAL HISTORY  Social History     Tobacco Use     Smoking status: Current Every Day Smoker     Packs/day: 0.50     Smokeless tobacco: Never Used   Substance Use Topics     Alcohol use: Yes     Comment: rarely     MEDICATIONS  No current facility-administered medications for this encounter.      No current outpatient medications on file.     ALLERGIES  No Known Allergies      I have reviewed the Medications, Allergies, Past Medical and Surgical History, and Social History in the Epic system.    Review of Systems   Constitutional: Positive for activity change.   HENT: Negative.    Respiratory: Negative.    Cardiovascular: Negative.    Gastrointestinal: Negative.   "  Genitourinary: Negative.    Musculoskeletal: Negative.    Neurological: Negative.    Psychiatric/Behavioral: Positive for behavioral problems, decreased concentration and sleep disturbance. Negative for hallucinations and suicidal ideas. The patient is nervous/anxious. The patient is not hyperactive.    All other systems reviewed and are negative.      Physical Exam   BP: (!) 138/93  Pulse: 84  Temp: 97.6  F (36.4  C)  Resp: 18  Height: 162.6 cm (5' 4\")  Weight: 86.9 kg (191 lb 9.6 oz)  SpO2: 100 %      Physical Exam  Vitals signs and nursing note reviewed.   Constitutional:       Appearance: Normal appearance.   HENT:      Head: Normocephalic and atraumatic.      Nose: Nose normal.      Mouth/Throat:      Mouth: Mucous membranes are moist.   Eyes:      Pupils: Pupils are equal, round, and reactive to light.   Neck:      Musculoskeletal: Normal range of motion.   Cardiovascular:      Rate and Rhythm: Normal rate and regular rhythm.   Pulmonary:      Effort: Pulmonary effort is normal.      Breath sounds: Normal breath sounds.   Abdominal:      General: Abdomen is flat.   Musculoskeletal: Normal range of motion.   Skin:     General: Skin is warm.   Neurological:      General: No focal deficit present.      Mental Status: She is alert and oriented to person, place, and time.   Psychiatric:         Attention and Perception: Attention and perception normal.         Mood and Affect: Mood and affect normal.         Speech: Speech normal.         Behavior: Behavior normal. Behavior is not agitated, aggressive, hyperactive or combative. Behavior is cooperative.         Thought Content: Thought content normal. Thought content is not paranoid or delusional. Thought content does not include homicidal or suicidal ideation.         Cognition and Memory: Cognition and memory normal.         Judgment: Judgment normal.         ED Course        Procedures               Labs Ordered and Resulted from Time of ED Arrival Up to the " Time of Departure from the ED   DRUG ABUSE SCREEN 6 CHEM DEP URINE ([facility])   HCG QUALITATIVE URINE   DRUG ABUSE SCREEN 6 CHEM DEP URINE ([facility])   HCG QUALITATIVE URINE            Assessments & Plan (with Medical Decision Making)   Patient with polysubstance abuse who currently is homeless. Family was hoping that patient can be admitted to an inpatient CD treatment program tonight. This is not possible. [facility] was able to make an appointment for a CD assessment through [facility] for Tuesday 11/26/19 at 10:30 AM. I recommended to mother that perhaps she can stay with her until she gets into treatment. This was not possible. The  called the homeless shelter for availability and she already has a reservation.     I have reviewed the nursing notes.    I have reviewed the findings, diagnosis, plan and need for follow up with the patient.    New Prescriptions    No medications on file       Final diagnoses:   Polysubstance abuse (H)   Homeless       11/24/2019   [facility], [place], EMERGENCY DEPARTMENT     Jason Turpin MD  11/24/19 5716

## 2019-11-25 NOTE — DISCHARGE INSTRUCTIONS
I am glad you are taking steps to manage your drug abuse problem. Please follow-up with referred scheduled appointment at [facility] on Tuesday 10:30 AM for further CD evaluation and treatment recommendations.

## 2020-06-03 ENCOUNTER — OFFICE VISIT - HEALTHEAST (OUTPATIENT)
Dept: SURGERY | Facility: CLINIC | Age: 30
End: 2020-06-03

## 2020-09-11 ENCOUNTER — OFFICE VISIT - HEALTHEAST (OUTPATIENT)
Dept: SURGERY | Facility: CLINIC | Age: 30
End: 2020-09-11

## 2020-09-11 DIAGNOSIS — K91.2 POSTOPERATIVE MALABSORPTION: ICD-10-CM

## 2020-09-11 ASSESSMENT — MIFFLIN-ST. JEOR: SCORE: 1455.71

## 2020-09-23 ENCOUNTER — OFFICE VISIT - HEALTHEAST (OUTPATIENT)
Dept: SURGERY | Facility: CLINIC | Age: 30
End: 2020-09-23

## 2020-09-23 DIAGNOSIS — Z98.84 BARIATRIC SURGERY STATUS: ICD-10-CM

## 2020-09-24 ENCOUNTER — OFFICE VISIT - HEALTHEAST (OUTPATIENT)
Dept: SURGERY | Facility: CLINIC | Age: 30
End: 2020-09-24

## 2020-09-24 ENCOUNTER — AMBULATORY - HEALTHEAST (OUTPATIENT)
Dept: LAB | Facility: CLINIC | Age: 30
End: 2020-09-24

## 2020-09-24 DIAGNOSIS — K91.2 POSTOPERATIVE MALABSORPTION: ICD-10-CM

## 2020-09-24 LAB
CHOLEST SERPL-MCNC: 153 MG/DL
ERYTHROCYTE [DISTWIDTH] IN BLOOD BY AUTOMATED COUNT: 13.1 % (ref 11–14.5)
FASTING STATUS PATIENT QL REPORTED: YES
FOLATE SERPL-MCNC: 10.8 NG/ML
HBA1C MFR BLD: 4.8 %
HCT VFR BLD AUTO: 35.9 % (ref 35–47)
HDLC SERPL-MCNC: 47 MG/DL
HGB BLD-MCNC: 12.1 G/DL (ref 12–16)
LDLC SERPL CALC-MCNC: 95 MG/DL
MCH RBC QN AUTO: 28.3 PG (ref 27–34)
MCHC RBC AUTO-ENTMCNC: 33.8 G/DL (ref 32–36)
MCV RBC AUTO: 84 FL (ref 80–100)
PLATELET # BLD AUTO: 298 THOU/UL (ref 140–440)
PMV BLD AUTO: 7.4 FL (ref 7–10)
PTH-INTACT SERPL-MCNC: 64 PG/ML (ref 10–86)
RBC # BLD AUTO: 4.28 MILL/UL (ref 3.8–5.4)
TRIGL SERPL-MCNC: 54 MG/DL
VIT B12 SERPL-MCNC: 384 PG/ML (ref 213–816)
WBC: 4.4 THOU/UL (ref 4–11)

## 2020-09-24 ASSESSMENT — MIFFLIN-ST. JEOR: SCORE: 1496.53

## 2020-09-25 LAB — 25(OH)D3 SERPL-MCNC: 34.1 NG/ML (ref 30–80)

## 2020-09-27 LAB
ANNOTATION COMMENT IMP: NORMAL
VIT A SERPL-MCNC: 0.41 MG/L (ref 0.3–1.2)
VITAMIN A (RETINYL PALMITATE): 0.04 MG/L (ref 0–0.1)
ZINC SERPL-MCNC: 73.4 UG/DL (ref 60–120)

## 2020-09-28 LAB — VIT B1 PYROPHOSHATE BLD-SCNC: 113 NMOL/L (ref 70–180)

## 2021-05-30 NOTE — TELEPHONE ENCOUNTER
"Pt is 6 months s/p LSG and called in with complaints of nausea and abdominal pain on her left side.  She says it feels like heartburn but it's not in her epigastric area.  Pt denies any GI insults but says she \"probably isn't eating right or getting enough fluids in\".  She says that has only very recently started taking her vitamins again and has \"a lot going on\" in her life right now and is trying to get back on track.  Pt has not been in for post op f/u since her 2 week post op with .  We discussed getting in sooner than the appointment that was made for her in August and she will see  and the dietitian on July 10th.  She was encouraged to increase her water intake, take her vitamins and start taking the script for Omeprazole that she has at home but hasn't been taking.  She is seeing her PCP today and would like lab orders sent there so that she can do her 6 month post op labs.  Pt was encouraged to call with any further questions or concerns and she verbalized understanding.    Rani John RN, CBN  [facility]  P [phone]  F [phone]    "

## 2021-05-31 VITALS — WEIGHT: 255 LBS | BODY MASS INDEX: 45.18 KG/M2 | HEIGHT: 63 IN

## 2021-05-31 VITALS — BODY MASS INDEX: 47.66 KG/M2 | WEIGHT: 269 LBS | HEIGHT: 63 IN

## 2021-05-31 VITALS — WEIGHT: 273.5 LBS | HEIGHT: 63 IN | BODY MASS INDEX: 48.46 KG/M2

## 2021-05-31 VITALS — WEIGHT: 263 LBS | BODY MASS INDEX: 46.6 KG/M2 | HEIGHT: 63 IN

## 2021-05-31 VITALS — BODY MASS INDEX: 47.31 KG/M2 | WEIGHT: 267 LBS | HEIGHT: 63 IN

## 2021-05-31 VITALS — HEIGHT: 63 IN | BODY MASS INDEX: 49.43 KG/M2 | WEIGHT: 279 LBS

## 2021-05-31 VITALS — BODY MASS INDEX: 45.18 KG/M2 | HEIGHT: 63 IN | WEIGHT: 255 LBS

## 2021-05-31 VITALS — BODY MASS INDEX: 49.09 KG/M2 | WEIGHT: 277.06 LBS | HEIGHT: 63 IN

## 2021-05-31 VITALS — BODY MASS INDEX: 47.53 KG/M2 | WEIGHT: 268.31 LBS

## 2021-06-01 VITALS — BODY MASS INDEX: 45.71 KG/M2 | WEIGHT: 258 LBS | HEIGHT: 63 IN

## 2021-06-01 VITALS — WEIGHT: 287 LBS | BODY MASS INDEX: 50.85 KG/M2 | HEIGHT: 63 IN

## 2021-06-02 VITALS — WEIGHT: 293 LBS | BODY MASS INDEX: 51.9 KG/M2

## 2021-06-02 VITALS — WEIGHT: 291 LBS | BODY MASS INDEX: 51.55 KG/M2

## 2021-06-02 VITALS — WEIGHT: 285 LBS | BODY MASS INDEX: 50.5 KG/M2 | HEIGHT: 63 IN

## 2021-06-02 VITALS — WEIGHT: 292.7 LBS | HEIGHT: 63 IN | BODY MASS INDEX: 51.86 KG/M2

## 2021-06-02 VITALS — HEIGHT: 63 IN | BODY MASS INDEX: 49.13 KG/M2 | WEIGHT: 277.3 LBS

## 2021-06-02 VITALS — WEIGHT: 268.5 LBS | HEIGHT: 63 IN | BODY MASS INDEX: 47.57 KG/M2

## 2021-06-02 VITALS — BODY MASS INDEX: 50.5 KG/M2 | HEIGHT: 63 IN | WEIGHT: 285 LBS

## 2021-06-02 VITALS — BODY MASS INDEX: 50.66 KG/M2 | WEIGHT: 286 LBS

## 2021-06-04 VITALS — WEIGHT: 169 LBS | BODY MASS INDEX: 29.95 KG/M2 | HEIGHT: 63 IN

## 2021-06-05 VITALS
DIASTOLIC BLOOD PRESSURE: 62 MMHG | HEIGHT: 63 IN | BODY MASS INDEX: 31.54 KG/M2 | SYSTOLIC BLOOD PRESSURE: 102 MMHG | WEIGHT: 178 LBS

## 2021-06-11 NOTE — PROGRESS NOTES
"    Stefanie Cordova is a 30 y.o. female who is being evaluated via a billable telephone visit.      The patient has been notified of following:     \"This telephone visit will be conducted via a call between you and your physician/provider. We have found that certain health care needs can be provided without the need for a physical exam.  This service lets us provide the care you need with a short phone conversation.  If a prescription is necessary we can send it directly to your pharmacy.  If lab work is needed we can place an order for that and you can then stop by our lab to have the test done at a later time.    If during the course of the call the physician/provider feels a telephone visit is not appropriate, you will not be charged for this service.\"     Stefanie Cordova complains of    Chief Complaint   Patient presents with     Nutrition Counseling       I have reviewed and updated the patient's Past Medical History, Social History, Family History and Medication List.    ALLERGIES  Patient has no known allergies.    Additional provider notes:     Post-op Surgical Weight Loss Diet Evaluation     Assessment:  Pt presents for 1 year post-op RD visit, s/p LSG on 12/11/18 with Dr. Mccollum. Today we reviewed current eating habits and level of physical activity, and instructed on the changes that are required for successful bariatric outcomes.    Patient Progress: Patient has maintained 110 lb weight loss since surgery.    Pt's Initial Weight: 279 lbs  Weight: 169 lb (76.7 kg)  Weight loss from initial: 110  % Weight loss: 39.43 %      There is no height or weight on file to calculate BMI.     Concerns: Patient does not have any questions or concerns    Vitamins   Multi Vit with Iron: yes  Calcium Citrate: yes  B12: yes  D3: yes    Do you experience hunger? Some times   Do you experience any reflux or discomfort with eating? Dairy - nauseous, carbs - \"too hard on stomach\"  Nausea: no  Vomiting: no  Diarrhea: " "no  Constipation: no  Hair loss:no    Diet Recall/Time:   Eats about 1-2 meals per day  Breakfast: Protein shake  Pm snack: protein shake  Dinner: chicken sandwich    Fluid-meal separation:  Fluids are  30min before and 30 minutes after meals.    Fluid Intake  Water: 3 bottles  Carbonation: every so often of soda  Juice: occasionally    Exercise: none      PES statement:      (NC-1.4) Altered GI Function related to Alteration in gastrointestinal tract structure and/or function/ Decreased functional length of the GI tract as evidenced by Weight loss of 39.4% initial body weight; sleeve gastrectomy    Intervention    Discussion  1. Discussed Post-Op Nutritional Guidelines for LSG  2. Recommended to consume 15-20gm protein at 3 meals daily, along with protein supplement/\"planned protein containing snack\" of 15-30gm protein, to reach goal of 60-80 gm protein daily.  3. Educated on post-op vitamin regimen: Multi Vit + iron 2x/day, calcium citrate 400-600 mg 2x/day, 9097-6843 mcg of Sublingual B-12 daily, and 5000 IU Vitamin D3 daily (MVI and calcium can be taken at the same time BID)    Instructions  1. Include 15-20gm protein at each meal, along with protein supplement/\"planned protein containing snack\" of 15-30gm protein, to reach goal of 60-80 gm protein daily.  2. Increase fluid intake to 64oz daily: choose plain or calorie/carbonation-free beverages.  3. Incorporate daily structured activity, 30-60 minutes most days of the week  4. Recommended pt to start taking: Multi Vit + iron 2x/day, calcium citrate 400-600 mg 2x/day, 7565-6037 mcg of Sublingual B-12 daily, and 5000 IU Vitamin D3 daily. (MVI and calcium can be taken at the same time)  5. Separate fluids 30 minutes before/after meal times.      Assessment/Plan:  1. Bariatric surgery status    Pt to follow up for 2 year  post-op visit with bariatrician     Phone call duration: 9 minutes    Dipti Leonardo RD      "

## 2021-06-11 NOTE — PATIENT INSTRUCTIONS - HE
[facility]  Nutritional Guidelines  Gastric Sleeve 18 Months Post Op and Beyond    General Guidelines and Helpful Hints:    Eat 3 meals per day + protein supplement(s). No snacks between meals.  o Do not skip meals.  This can cause overeating at the next meal and will prevent adequate protein and nutritional intake.    Aim for 60-80 grams of protein per day.  o Always eat your protein first. This assists with optimal nutrition and helps you stay full longer.  o Depending on your portion size, you may need to drink approved protein supplement between meals to achieve protein goals. Follow recommendations of your Dietitian.     Eat your protein first, and then follow with fiber.   o It is not necessary to count your fiber, but 15-20 grams per day is recommended.    o Add fiber by including fruits, vegetables, whole grains, and beans.     Portions should remain about 1 cup per meal. Use measuring cups to be accurate.    Continue to use saucer/salad plates, infant/toddler silverware to keep portion sizes small and take small bites.    Eat S-L-O-W-L-Y to make each meal last 20-30 minutes. Always stop eating when satisfied.    Continue to use caution with foods containing skins, peels or membranes. Chew well!    Aim for 64 oz. of calorie-free fluids daily.  o Continue to avoid caffeine and carbonation. If you choose to drink alcohol, do so in moderation.   o Remember to avoid drinking during meals, 15-30 minutes before and 30 minutes after.    Exercise is zafar for continued weight loss and weight maintenance. Aim for 30-60 minutes of physical activity most days of the week. Include cardiovascular and strength training.    If having trouble tolerating meat, try using a crock-pot, tinfoil tent, steamer or other moist cooking method to create tender meats. Add broth or low-fat gravy to help meat stay moist.     Avoid high sugar and high fat foods to prevent high calorie intakes and a reduced rate of weight  loss, or weight regain.  o Check nutrition labels for less than 10 grams of sugar and less than 10 grams of fat per serving.    Continue Taking Vitamins/Minerals:  o 0915-6509 mcg of Sublingual B-12 daily  o 1 Multivitamin with Iron twice daily (chewable or swallow tabs)  o 500-600 mg Calcium Citrate twice daily (chewable or swallow tabs)  o 5000 IU Vitamin D3 daily    Sample Grocery List    Protein:    Fat free Greek or light yogurt (less than 10 grams sugar)    Fat free or low-fat cottage cheese    String cheese or reduced fat cheese slices    Tuna, salmon, crab, egg, or chicken salad made with light or fat free mayonnaise    Egg or Egg Substitute    Lean/extra lean turkey, beef, bison, venison (ground, sirloin, round, flank)    Pork loin or tenderloin (grilled, baked, broiled)    Fish such as salmon, tuna, trout, tilapia, etc. (grilled, baked, broiled)    Tender cuts of lean (skinless) turkey or chicken    Lean deli meats: turkey, lean ham, chicken, lean roast beef    Beans such as kidney, garbanzo, black, tinajero, or low-fat/fat free refried beans    Peanut butter (natural preferred). Limit to 1 Tbsp. per day.    Low-fat meatloaf (made with lean ground beef or turkey)    Sloppy Joes made with low-sugar ketchup and lean ground beef or turkey    Soy or vegetable protein (i.e. vegan crumbles, soy/veggie burger, tofu)    Hummus    Vegetables:    Fresh: cooked or raw (as tolerated)    Frozen vegetables    Canned vegetables (low sodium or no salt added, rinse before cooking/eating)    (Ok to have skins/peels/membranes/seeds - just chew well)    Fruits:    Fresh fruit    Frozen fruit (no sugar added)    Canned fruit (packed in its own juice, NOT syrup)    (Ok to have skins/peels/membranes/seeds - just chew well)    Starch:    Unsweetened whole-grain hot cereal (or high fiber cold cereal, dry)    Toasted whole wheat bread or [place] Thins    Whole grain crackers    Baked /boiled/mashed potato/sweet potato    Cooked whole  grain pasta, brown rice, or other cooked whole grains    Starchy vegetables: corn, peas, winter squash    Protein Supplement:     Ready to drink protein shake with:  o 15-30 grams protein per serving  o Less than 10 grams total carbohydrate per serving     Protein powder mixed with:  o  Skim or 1% milk  o Low fat or fat free Lactaid milk, plain or no sugar added soymilk  o Water     Fats: (use in moderation)    1 teaspoon of soft tub margarine    1 teaspoon olive oil, canola oil, or peanut oil    1 tablespoon of low-fat ofntanez or salad dressing     Sample Menu for 18+ months after Gastric Sleeve    You do NOT need to eat/drink the full portion sizes listed below  Always stop when you are satisfied    Breakfast   cup 1% cottage cheese     cup mixed berries   Lunch 2 oz lean roast beef on   [place] Thin with 1 tsp. light fontanez    small tomato, chopped, mixed with 1 tsp. light vinaigrette dressing   Supplement Approved protein supplement (if needed between meals)   Dinner 2 oz grilled salmon    cup salad greens with 1 tsp. light salad dressing and 1 tsp. ground flax seed    cup quinoa or brown rice     Breakfast   cup egg substitute with   cup sautéed chopped vegetables  2 light [place] Krisp crackers   Lunch Tuna Melt:   cup tuna mixed with 1 tsp. light fontanez over   [place] Thin. Top with 2-3 slices cucumber and 1 oz slice of low fat cheese   Supplement 1 cup skim milk (if needed between meals)   Dinner 3 oz  grilled, broiled, or baked seasoned skinless chicken breast    cup asparagus     Breakfast   cup plain oatmeal made with skim or 1% milk with 1 Tbsp. flavored/unflavored protein powder added  1 mozzarella string cheese   Lunch 2 oz deli turkey breast  1/3 cup salad with 1 tsp. light salad dressing, 1/8 of a whole avocado and 1 Tbsp. sunflower seeds   Dinner 3 oz. pork loin made in a crock pot, seasoned with a spice rub    cup cooked carrots   Supplement Approved protein supplement (if needed between meals)      Breakfast 1 cup breakfast casserole made with egg substitute, turkey sausage,  and steamed, chopped bell peppers   Supplement  1 cup light Greek yogurt (if needed between meals)   Lunch 2 oz. teriyaki turkey    cup mashed sweet potato with 1-2 spritzes of spray butter (like [place])    cup fresh pineapple   Dinner 3 oz low fat meatloaf    cup roasted garlic zucchini     Breakfast   cup leftover breakfast casserole    cup no sugar added applesauce with 1 Tbsp. unflavored protein powder and a sprinkle of cinnamon    Lunch 3 oz shrimp with 1-2 Tbsp. low-sugar cocktail sauce for dipping    c. whole wheat pasta drizzled with   tsp. olive oil   Supplement 1 cup skim/1% milk with scoop of protein powder (if needed between meals)   Dinner Grilled, seasoned kebob with 2 oz lean beef and   cup vegetables     Breakfast Breakfast pizza:   [place] Thin spread with 1 Tbsp. low sugar spaghetti sauce,   cup shredded low fat cheese, melted and 1 slice of [place] blankenship     cup fresh fruit mixed with chopped almonds   Lunch   cup black bean soup  4-5 whole grain crackers   Dinner 3 oz  tilapia with lemon pepper seasoning    cup stewed tomatoes   Supplement 1 string cheese (if needed between meals)     Breakfast 2 hard boiled eggs (discard 1 egg yolk)    whole wheat English Muffin with 1 tsp. low sugar jelly   Lunch   cup leftover black bean soup topped with 1-2 Tbsp. low fat cheese  2-3 light [place] Krisp crackers   Supplement Approved protein supplement (if needed between meals)   Dinner 3 oz sirloin steak    cup steamed broccoli

## 2021-06-11 NOTE — PROGRESS NOTES
Bariatric Follow Up Visit with a History of Previous Bariatric Surgery     Date of visit: 9/24/2020  Physician: Lilo Altman MD  Primary Care Provider:  Jovanna Yanez MD Ashley A Pacheco   30 y.o.  female    Date of Surgery: 12/11/2018  Initial Weight: 279  Initial BMI: 46.43  Today's Weight:   Wt Readings from Last 1 Encounters:   09/24/20 178 lb (80.7 kg)     Body mass index is 31.53 kg/m .      Assessment and Plan     Assessment: Stefanie is a 30 y.o. year old female who is almost 2 yrs s/p  Sleeve Gastrectomy with Dr. Veda Cordova feels as if she has achieved the goals she hoped to accomplish through bariatric surgery and weight loss.    Encounter Diagnosis   Name Primary?     Postoperative malabsorption Yes         Current Outpatient Medications:      BIOTIN ORAL, Take by mouth., Disp: , Rfl:      calcium-vitamin D 250-100 mg-unit per tablet, Take 1 tablet by mouth 2 (two) times a day., Disp: , Rfl:      cholecalciferol, vitamin D3, (VITAMIN D3) 50 mcg (2,000 unit) capsule, Take 1,000 Units by mouth daily., Disp: , Rfl:      cyanocobalamin 100 MCG tablet, Take 100 mcg by mouth daily., Disp: , Rfl:      docusate sodium (COLACE) 50 MG capsule, Take by mouth 2 (two) times a day., Disp: , Rfl:      pediatric multivitamin no.144 (CHILDREN'S CHEWABLE VITAMIN) Chew, Chew., Disp: , Rfl:      sertraline (ZOLOFT) 25 MG tablet, Take 1 tablet by mouth daily., Disp: , Rfl:      sertraline (ZOLOFT) 50 MG tablet, , Disp: , Rfl:     Plan: Will work toward 3 meals, will work to a walk a day when able. Labs today. dieitian prn. Call it quits    Return in about 1 year (around 9/24/2021).    Bariatric Surgery Review     Interim History/LifeChanges: Has been under a lot of stress and is not back on track. Restarted vitamins 2 weeks ago d/t profound fatigue, being cold, and just not feeling well.     Patient Concerns: labs, constipatation  Appetite (1-10): low  GERD: no    Medication changes: restarted  vitamins 2 weeks ago and increased zoloft to 50mg daily    Vitamin Intake:   B-12   SL   MVI  1/d   Vitamin D  5,000   Calcium   no     Other  iron              LABS: ordered  Will do after visit  Nausea no  Vomiting yes  Constipation yes  Diarrhea no  Rashes no  Hair Loss yes  Calf tenderness no  Breathing difficulty no  Reactive Hypoglycemia feels sick if eating too much  Light Headedness sometimes   Moods stable    12 point ROS as above and otherwise negative      Habits:  Alcohol: no  Tobacco: sometimes for 1 yr.  Caffeine no  NSAIDS no  Exercise Routine: ADLs,   3 meals/day tries to but typically 2-protein shake for breakfast, chicken for lunch  Protein first yes  ?grams/day  Water Separate from meals yes  Calorie Containing Beverages rare  Restaurant eating/wk occ  Sleeping 6-7 hours  Stress low  CPAP: NA  Contraception: TL  DEXA:NA    Social History     Social History     Socioeconomic History     Marital status:      Spouse name: Not on file     Number of children: Not on file     Years of education: Not on file     Highest education level: Not on file   Occupational History     Not on file   Social Needs     Financial resource strain: Not on file     Food insecurity     Worry: Not on file     Inability: Not on file     Transportation needs     Medical: Not on file     Non-medical: Not on file   Tobacco Use     Smoking status: Former Smoker     Last attempt to quit: 6/6/2017     Years since quitting: 3.3     Smokeless tobacco: Never Used   Substance and Sexual Activity     Alcohol use: Yes     Alcohol/week: 0.0 - 2.0 standard drinks     Drug use: No     Sexual activity: Yes     Partners: Male     Birth control/protection: Surgical   Lifestyle     Physical activity     Days per week: Not on file     Minutes per session: Not on file     Stress: Not on file   Relationships     Social connections     Talks on phone: Not on file     Gets together: Not on file     Attends Holiness service: Not on file      Active member of club or organization: Not on file     Attends meetings of clubs or organizations: Not on file     Relationship status: Not on file     Intimate partner violence     Fear of current or ex partner: Not on file     Emotionally abused: Not on file     Physically abused: Not on file     Forced sexual activity: Not on file   Other Topics Concern     Not on file   Social History Narrative     Not on file       Past Medical History     Past Medical History:   Diagnosis Date     Alcoholism (H)     had some treatment court ordered around age 16.     Depression     in her teens hospitalized around 15yo, wrist cutting.      Disease of thyroid gland     temporary meds during her pregnancy, now resolved as of her last check.     Obesity      Urinary incontinence     mild stress incontinence     Problem List     Patient Active Problem List   Diagnosis     Depression     Adjustment disorder with depressed mood     ASCUS of cervix with negative high risk HPV     Borderline personality disorder (H)     History of abnormal cervical Pap smear     Intermittent explosive disorder     Normal delivery at term     Thyroid dysfunction     Medications     Current Outpatient Medications   Medication Sig     BIOTIN ORAL Take by mouth.     calcium-vitamin D 250-100 mg-unit per tablet Take 1 tablet by mouth 2 (two) times a day.     cholecalciferol, vitamin D3, (VITAMIN D3) 50 mcg (2,000 unit) capsule Take 1,000 Units by mouth daily.     cyanocobalamin 100 MCG tablet Take 100 mcg by mouth daily.     docusate sodium (COLACE) 50 MG capsule Take by mouth 2 (two) times a day.     pediatric multivitamin no.144 (CHILDREN'S CHEWABLE VITAMIN) Chew Chew.     sertraline (ZOLOFT) 25 MG tablet Take 1 tablet by mouth daily.     sertraline (ZOLOFT) 50 MG tablet      Surgical History     Past Surgical History  She has a past surgical history that includes Tubal ligation; Tympanostomy tube placement (Bilateral); and pr lap, maribell restrict proc,  "longitudinal gastrectomy (N/A, 12/11/2018).    Objective-Exam     Constitutional:  /62 (Patient Site: Right Arm, Patient Position: Sitting, Cuff Size: Adult Regular)   Ht 5' 3\" (1.6 m)   Wt 178 lb (80.7 kg)   BMI 31.53 kg/m    Height: 5' 3\" (1.6 m) (9/24/2020 11:43 AM)  Initial Weight: 279 lbs (9/11/2020 10:00 AM)  Weight: 178 lb (80.7 kg) (9/24/2020 11:43 AM)  Weight loss from initial: 110 (9/11/2020 10:00 AM)  % Weight loss: 39.43 % (9/11/2020 10:00 AM)  BMI (Calculated): 31.5 (9/24/2020 11:43 AM)    General:  Pleasant and in no acute distress   Eyes:  EOMI  ENT:  Airway 1+  Moist mucous membranes  Neck:  Supple, No LAD, No thyromegaly, No carotid bruits appreciated  Respiratory: Normal respiratory effort, no cough, wheezes or crackles  CV:  Regular rate and Rhythm,no murmurs, pulses 2+, no calf tenderness, no LE edema  Gastrointestinal: Abdomen NT/ND, BS+  Musculoskeletal: muscle mass WNL  Skin: color fair hair full, incisions nicely healed  Neurological: No tremor, normal gait  Psychiatric: alert and oriented X3, mood and affect normal    Counseling     We reviewed the important post op bariatric recommendations:  -eating 3 meals daily  -eating protein first, getting >60gm protein daily  -eating slowly, chewing food well  -avoiding/limiting calorie containing beverages  -drinking water 15-30 minutes before or after meals  -choosing wheat, not white with breads, crackers, pastas, andrew, bagels, tortillas, rice  -limiting restaurant or cafeteria eating to twice a week or less    We discussed the importance of restorative sleep and stress management in maintaining a healthy weight.  We discussed the National Weight Control Registry healthy weight maintenance strategies and ways to optimize metabolism.  We discussed the importance of physical activity including cardiovascular and strength training in maintaining a healthier weight.    We discussed the importance of life-long vitamin supplementation and " life-long  follow-up.    Stefanie was reminded that, to avoid marginal ulcers she should avoid tobacco at all, alcohol in excess, caffeine in excess, and NSAIDS (unless indicated for cardioprotection or othewise and opposed by a PPI).    Lilo Altman MD, [facility].  9/24/2020  11:58 AM      No images are attached to the encounter.   30 minutes spent with patient. >50% in counseling and coordination of care.

## 2021-06-12 NOTE — PROGRESS NOTES
I have submitted a referral on behalf of this patient to [facility] to participate in their Call to Change program for surgical weight loss.  She will need to complete 5 phone calls, be cleared by both our psych and nutrition teams and complete the needs list provided to her by Dr. Feng before being scheduled for a surgical consult
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
## 2021-06-13 NOTE — PROGRESS NOTES
Attended support group. Workflow updated.    Jitendra Dickson [facility]  P: [phone]  F: [phone]
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
## 2021-06-14 NOTE — PROGRESS NOTES
Non-surgical Weight Loss Follow Up Diet Evaluation    Assessment:  This patient is a 27 y.o. female is being seen today for follow-up non-surgical nutritional evaluation. Today we reviewed the patients current eating habits and level of physical activity, and instructed on the changes that are required for successful weight loss outcomes.    *After discernment, pt has decided to pursue non-surgical weight loss at this time.     Phentermine: taking daily.     Pt's Initial Weight: 279 lbs  Weight: 267 lb (121.1 kg)  Weight loss from initial: 12  % Weight loss: 4.3 %  BMI: Body mass index is 47.3 kg/(m^2).  IBW: 115 lbs    Personal goal weight: 170-200 lb     Estimated RMR ([place]-St Jeor equation): 1917 calories  Protein requirements (.5grams to .9grams per pound IBW, 20-30% of calories, minimum of 60-80gm per day):   grams    Progress made since last visit: Pt has reduced portion sizes and sticking to more routine. Pt making more protein shakes. Pt reports having increased constipation, with BM every 4 days.     Concerns: skipping meals, poor protein intake, poor fiber intake.   *Pt expressed being low income and how buying healthy foods can be expensive. Pt utilizes food stamps.     Diet Recall/Time:   Breakfast: none   Am Snack: none   Lunch: none   Pm snack: protein bar  Or johnnie seed, frozen fruit, vegetables, pineapple juice, protein powder sometimes-   -goes to gym   Dinner: meat/ground beef, vegetables   HS Snack: none     Meals per week away from home: 1x/month      Recommended limiting eating out to no more than 2x/week.  Patient and I reviewed the importance of eating three consistent meals per day; as well as meal timing to be spaced 4-5 hours apart.  Snack choices: 100-150 calories (1-2x/day if physically hungry), incorporating a fruit/vegetable w/ protein source.    Meal Duration: not keeping track    Portion Sizes problematic? NO per patient/diet recall  Encouraged slowing meal times down, 20-30  minutes, chewing to applesauce consistency.   To aid in proper portion control and slow meal time down discussed consuming meals off smaller plates, use toddler/children utensils and set utensils down after each bite.    Protein, vegetables/fruits, carbohydrates:   The patient and I discussed the importance of including lean/low fat protein at each meal and limiting carbohydrate intake to less than 25% of plate volume.       Vitamins/Mineral Supplementation: MVI, apple cider vinegar supplements, fiber pill, probiotic.     Beverages (Type/Oz. per day)  Water: 64-72 oz     Discussed the importance of adequate hydration and the goal of 64+ oz of fluid daily.   The patient understands the importance of  avoiding all sweetened and alcoholic drinks, and instead choosing 64 oz plain water.    Exercise  Pt goes to the gym 45 minutes goes walking and then weights.    Pt's understands that 45-60 minutes of daily activity is an important part of weight loss success.   Encouraged pt to incorporate  strength training exercise in addition to cardiovascular exercise most days of the week.    PES statement:     1. (NC-3.3.5) Obese, class III, BMI ?40 related to Disordered eating pattern as evidenced by skipping meals ; Large portions; Inability to apply nutrition related recommendations.     Intervention:  Discussion:  1. Reviewed lean protein sources.  20-30 gm protein at 3 meals daily. 60-80 grams daily total.  2. Carbohydrate Countin carbohydrate choice/serving = 15-20gm total carbohydrate   3. Developed custom meal plan for patient   4. Plate Method: The patient and I discussed the importance of including lean/low  fat protein at each meal and limiting carbohydrate intake to less  than 25% of plate volume.    Instructions/Goals:   1. Include 20-30 gm protein at each meal.  2. Increase vegetable/fruit intake, by having a vegetable or fruit with each meal daily. Recommended pt to increase vegetable/fruit intake to 4-5  servings daily.  3. Increase fluid intake to 64oz daily: choose plain or calorie/alcohol-free beverages.  4. Incorporate daily structured activity, 45-60 minutes most days of the week  5. Read food labels more consistently: keeping total fat grams <10, total sugar grams <10, fiber >3gm per serving.   6. Practice plate method: 1/2 plate lean/low fat protein source, vegetable/fruit, <25% of plate complex carbohydrates.  7. Carbohydrates from grain sources at meal times to be no more than 1 Carb Choice, ie: 15-20 gm total carbohydrate per serving  8. Practice eating off of smaller plates/bowls, chewing to applesauce consistency, taking 20-30 minutes to eat in a calm/relaxed environment without distractions of tv/email/cell phone.    Handouts Provided:  List of protein supplements  List of protein sources  Plate method  Custom meal plan     Monitor/Evaluation:    Pt will f/u in one month with RD.     Plan for next visit with RD:    Review plate method and meal plan   Educated pt on food labels  Review carbohydrates/fiber  Exercise      Time In: 11:40am  Time Out: 12:30pm       ABN signed: Yes

## 2021-06-14 NOTE — PROGRESS NOTES
"Bariatric Clinic Follow-Up Visit:    Stefanie Cordova is a 27 y.o.  female with Body mass index is 48.45 kg/(m^2).  presenting here today for follow-up on non-surgical efforts for weight loss. Original Intake visit occurred on 8/28/17 with a weight of 279lbs and BMI of 46.4.  Along with diet and behavior changes, she been pursuing surgical weight loss options to assist her weight loss goals but decided recently that she didn't feel comfortable with that tool. She's interested in starting a non surgical weight loss trial and using medications to assist her diet and behavior changes.  Since her intake labs showed a hypothyroid state she followed up with her PCP and her TSH had just edged back into the normal range to 4.2 (5.7 at intake).   Weight:   Wt Readings from Last 2 Encounters:   11/13/17 (!) 273 lb 8 oz (124.1 kg)   09/27/17 (!) 268 lb 5 oz (121.7 kg)    pounds  Height: 5' 3\" (1.6 m) (11/13/2017 10:02 AM)  Initial Weight: 279 lbs (11/13/2017 10:02 AM)  Weight: 273 lb 8 oz (124.1 kg) (11/13/2017 10:02 AM)  Weight loss from initial: 5.5 (11/13/2017 10:02 AM)  % Weight loss: 1.97 % (11/13/2017 10:02 AM)  BMI (Calculated): 48.5 (11/13/2017 10:02 AM)  SpO2: 98 % (9/27/2017  9:46 AM)  Waist Circumference (In): 52 Inches (8/28/2017 10:55 AM)  Hip Circumference (In): 56 Inches (8/28/2017 10:55 AM)  Neck Circumference (In): 15.5 Inches (8/28/2017 10:55 AM)    Comorbidities:  Patient Active Problem List   Diagnosis     Obesity, Class III, BMI 40-49.9 (morbid obesity)     Depression       Current Outpatient Prescriptions:      pediatric multivitamin no.76 (FLINTSTONES COMPLETE) Chew, Chew 1 tablet daily., Disp: , Rfl:      phentermine (ADIPEX-P) 37.5 mg tablet, Start with 1/2 a tablet daily (18.75mg)., Disp: 30 tablet, Rfl: 0      Interim: Since our last visit, she has got a new job on the nightshift.    Plan:   1.  Diet: 1500kcal goal  2. Exercise: challenged to start  3. Medication: trial of phentermine half tablet " daily, could consider adding topamax later if struggling.  Contrave would be an option if not tolerating phentermine.  4.  Stress Reduction: new job  5. Goals: 10% weight loss from intake would be 27 lbs, 252 lbs.  6. Will have her visit with dietician.      We discussed [facility] Bariatric Basics including:  -eating 3 meals daily  -eating protein first  -eating slowly, chewing food well  -avoiding/limiting calorie containing beverages  -We discussed the importance of restorative sleep and stress management in maintaining a healthy weight.  -We discussed the National Weight Control Registry healthy weight maintenance strategies and ways to optimize metabolism.  -We discussed the importance of physical activity including cardiovascular and strength training in maintaining a healthier weight and explored viable options.    Most recent labs:  Lab Results   Component Value Date    WBC 5.5 09/07/2017    HGB 12.4 09/07/2017    HCT 36.9 09/07/2017    MCV 84 09/07/2017     09/07/2017     Lab Results   Component Value Date    CHOL 152 09/07/2017     Lab Results   Component Value Date    HDL 36 (L) 09/07/2017     Lab Results   Component Value Date    LDLCALC 99 09/07/2017     Lab Results   Component Value Date    TRIG 86 09/07/2017     No components found for: CHOLHDL  Lab Results   Component Value Date    ALT 16 09/07/2017    AST 19 09/07/2017    ALKPHOS 84 09/07/2017    BILITOT 0.4 09/07/2017     Lab Results   Component Value Date    HGBA1C 5.1 09/07/2017     Lab Results   Component Value Date    QFVDGIFW68 502 09/07/2017     Lab Results   Component Value Date    WOGTNFFH45VZ 25.2 (L) 09/07/2017     Lab Results   Component Value Date    FERRITIN 38 09/07/2017     Lab Results   Component Value Date    PTH 37 09/07/2017     Lab Results   Component Value Date    17724 141 09/07/2017     Lab Results   Component Value Date    7597 32.7 09/07/2017     Lab Results   Component Value Date    TSH 4.16 09/27/2017     No  "results found for: TESTOSTERONE    DIETARY HISTORY    Positive Changes Since Last Visit: na  Struggling With: no longer wishes for surgical tool.    Knowledgeable in Reading Food Labels: improving, has been working with dietician.  Getting Adequate Protein: not always  Sleeping 7-8 hours/day not alwyas  Stress management now on nightshift    PHYSICAL ACTIVITY PATTERNS:  Cardiovascular: na  Strength Training: n    REVIEW OF SYSTEMS  GENERAL/CONSTITUTIONAL:  Mild uri sx currently.  HEENT:   na  CARDIOVASCULAR:   previously had heart murmur looked into and was told it was \"normal\".   PULMONARY:   na  GASTROINTESTINAL:  No issues  UROLOGIC:  na  NEUROLOGIC:  na  PSYCHIATRIC:  Depression stable.  MUSCULOSKELETAL/RHEUMATOLOGIC   na  ENDOCRINE:  na  DERMATOLOGIC:  na    PHYSICAL EXAM:  Vitals: /69  Pulse 68  Resp 16  Ht 5' 3\" (1.6 m)  Wt (!) 273 lb 8 oz (124.1 kg)  BMI 48.45 kg/m2  Height: 5' 3\" (1.6 m) (11/13/2017 10:02 AM)  Initial Weight: 279 lbs (11/13/2017 10:02 AM)  Weight: 273 lb 8 oz (124.1 kg) (11/13/2017 10:02 AM)  Weight loss from initial: 5.5 (11/13/2017 10:02 AM)  % Weight loss: 1.97 % (11/13/2017 10:02 AM)  BMI (Calculated): 48.5 (11/13/2017 10:02 AM)  SpO2: 98 % (9/27/2017  9:46 AM)  Waist Circumference (In): 52 Inches (8/28/2017 10:55 AM)  Hip Circumference (In): 56 Inches (8/28/2017 10:55 AM)  Neck Circumference (In): 15.5 Inches (8/28/2017 10:55 AM)    GEN: Pleasant, well groomed, in no acute distress  HEENT: PEERL, EOMI, airway clear .  NECK: mildly enlarged profile.   HEART: Rhythm regular, rate regular, I-II/VI systolic murmur.  LUNGS: Clear without crackles or wheezes. No cough.  ABDOMEN: obese.  EXTREMITIES: 2 plus palpable peripheral pulses, radial.  NEURO: Alert and Oriented X3, normal gait and speech.  SKIN: No visible rashes.        25 minutes was spent in direct consultation, with over 50% of it spent in counseling regarding their plan for excess weight reduction and health " modification.  Sudheer Feng MD  [facility]  10:34 AM

## 2021-06-14 NOTE — PROGRESS NOTES
Here for non-surgical weight loss f/u visit.  See flowsheet.  Measurements done today.    Rani John RN, CBN  [facility]  P [phone]  F [phone]

## 2021-06-14 NOTE — PROGRESS NOTES
"Bariatric Clinic Follow-Up Visit:    Stefanie Cordova is a 27 y.o.  female with Body mass index is 46.59 kg/(m^2).  presenting here today for follow-up on non-surgical efforts for weight loss. Original Intake visit occurred on 8/28/17 with a weight of 279 lbslbs and BMI of 46.4.  Along with diet and behavior changes, she has been using phentermine to assist her weight loss goals.  See her intake visit notes for details on identified contributors to weight gain in the past.  She was originally interested in surgery but changed her mind and has been working on nonsurgical weight loss the last couple of months.  Weight:   Wt Readings from Last 2 Encounters:   12/18/17 (!) 263 lb (119.3 kg)   12/01/17 (!) 267 lb (121.1 kg)    pounds  Height: 5' 3\" (1.6 m) (12/18/2017 11:06 AM)  Initial Weight: 279 lbs (12/18/2017 11:06 AM)  Weight: 263 lb (119.3 kg) (12/18/2017 11:06 AM)  Weight loss from initial: 16 (12/18/2017 11:06 AM)  % Weight loss: 5.73 % (12/18/2017 11:06 AM)  BMI (Calculated): 46.6 (12/18/2017 11:06 AM)  SpO2: 98 % (9/27/2017  9:46 AM)  Waist Circumference (In): 52 Inches (8/28/2017 10:55 AM)  Hip Circumference (In): 56 Inches (8/28/2017 10:55 AM)  Neck Circumference (In): 15.5 Inches (8/28/2017 10:55 AM)    Comorbidities:  Patient Active Problem List   Diagnosis     Obesity, Class III, BMI 40-49.9 (morbid obesity)     Depression       Current Outpatient Prescriptions:      APPLE CIDER VINEGAR ORAL, Take 1 tablet by mouth daily., Disp: , Rfl:      B INFANTIS/B ANI/B VANESSA/B BIFID (PROBIOTIC 4X ORAL), Take 1 tablet by mouth daily., Disp: , Rfl:      biotin 1 mg cap, Take 1 tablet by mouth daily., Disp: , Rfl:      FIBER, DEXTRIN, ORAL, Take 1 tablet by mouth daily., Disp: , Rfl:      pediatric multivitamin no.76 (FLINTSTONES COMPLETE) Chew, Chew 1 tablet daily., Disp: , Rfl:      phentermine (ADIPEX-P) 37.5 mg tablet, Start with 1/2 a tablet daily (18.75mg)., Disp: 30 tablet, Rfl: 0      Interim: Since our last " visit, she has continued to lose weight, down 16 lbs since August. She's seen our dietician 12/1/17 and is down another 4 lbs since then.  She struggles with finances, is on food stamps.  She has a RMR of 1917kcal/day.    Plan:   1.  Diet: 60-100g/day.  2. Exercise: continue her 3-5 day per week [employer] regimen, she's noticing more change in body shape at this point.  3. Medication: tolerating phentermine, refill sent. Taking her vitamin d.    4.  Stress Reduction:  Managing.  Struggles a bit with night shift eating schedule and we discussed the importance of not skipping meals.  5. Goals: 10% goal is 252 lbs.  6. Constipation. Lack of fibrous fruits/veggies. Trial of colace and citrucel and hitting water target of 70-100oz per day.      We discussed [facility] Basics including:  -eating 3 meals daily  -eating protein first  -eating slowly, chewing food well  -avoiding/limiting calorie containing beverages  -We discussed the importance of restorative sleep and stress management in maintaining a healthy weight.  -We discussed the National Weight Control Registry healthy weight maintenance strategies and ways to optimize metabolism.  -We discussed the importance of physical activity including cardiovascular and strength training in maintaining a healthier weight and explored viable options.    Most recent labs:  Lab Results   Component Value Date    WBC 5.5 09/07/2017    HGB 12.4 09/07/2017    HCT 36.9 09/07/2017    MCV 84 09/07/2017     09/07/2017     Lab Results   Component Value Date    CHOL 152 09/07/2017     Lab Results   Component Value Date    HDL 36 (L) 09/07/2017     Lab Results   Component Value Date    LDLCALC 99 09/07/2017     Lab Results   Component Value Date    TRIG 86 09/07/2017     No components found for: CHOLHDL  Lab Results   Component Value Date    ALT 16 09/07/2017    AST 19 09/07/2017    ALKPHOS 84 09/07/2017    BILITOT 0.4 09/07/2017     Lab Results   Component Value Date     "HGBA1C 5.1 09/07/2017     Lab Results   Component Value Date    IJAKSVKE64 502 09/07/2017     Lab Results   Component Value Date    KETIWWXZ61CZ 25.2 (L) 09/07/2017     Lab Results   Component Value Date    FERRITIN 38 09/07/2017     Lab Results   Component Value Date    PTH 37 09/07/2017     Lab Results   Component Value Date    65691 141 09/07/2017     Lab Results   Component Value Date    7597 32.7 09/07/2017     Lab Results   Component Value Date    TSH 4.16 09/27/2017     No results found for: TESTOSTERONE    DIETARY HISTORY    Positive Changes Since Last Visit: working with dietician and starting to look at labels more  Struggling With: skipping meals while on the nightshift.    Knowledgeable in Reading Food Labels: improving  Getting Adequate Protein: improving  Sleeping 7-8 hours/day usually around 5-6.  Stress management stable.    PHYSICAL ACTIVITY PATTERNS:  Cardiovascular: HIT on bike/walks treadmill  Strength Training: weights [employer].    REVIEW OF SYSTEMS  GENERAL/CONSTITUTIONAL:  No illness  HEENT:   na  CARDIOVASCULAR:   no chest pains/palps  PULMONARY:   no FLANNERY  GASTROINTESTINAL:  Constipation.  UROLOGIC:  na  NEUROLOGIC:  No headaches  PSYCHIATRIC:  Stable moods on phentermine  MUSCULOSKELETAL/RHEUMATOLOGIC   no injuries  ENDOCRINE:  na  DERMATOLOGIC:  na    PHYSICAL EXAM:  Vitals: /65  Pulse 75  Resp 16  Ht 5' 3\" (1.6 m)  Wt (!) 263 lb (119.3 kg)  BMI 46.59 kg/m2  Height: 5' 3\" (1.6 m) (12/18/2017 11:06 AM)  Initial Weight: 279 lbs (12/18/2017 11:06 AM)  Weight: 263 lb (119.3 kg) (12/18/2017 11:06 AM)  Weight loss from initial: 16 (12/18/2017 11:06 AM)  % Weight loss: 5.73 % (12/18/2017 11:06 AM)  BMI (Calculated): 46.6 (12/18/2017 11:06 AM)  SpO2: 98 % (9/27/2017  9:46 AM)  Waist Circumference (In): 52 Inches (8/28/2017 10:55 AM)  Hip Circumference (In): 56 Inches (8/28/2017 10:55 AM)  Neck Circumference (In): 15.5 Inches (8/28/2017 10:55 AM)    GEN: Pleasant, well groomed, in no acute " distress, accompanied by her sister and her own daughter.  HEENT: PEERL, EOMI, airway clear .  NECK: No swelling.  HEART: Rhythm regular, rate regular, no murmur   LUNGS: Clear without crackles or wheezes. No cough.  ABDOMEN: obese..  EXTREMITIES: 2 plus palpable peripheral pulses, radial. No tremor. Normal gait.  NEURO: Alert and Oriented X3, normal gait and speech.  SKIN: No visible pallor. Some acne to chin.        25 minutes was spent in direct consultation, with over 50% of it spent in counseling regarding their plan for excess weight reduction and health modification.  Sudheer Feng MD  [facility]  11:12 AM

## 2021-06-15 NOTE — PROGRESS NOTES
Non-surgical Weight Loss Follow Up Diet Evaluation    Assessment:  This patient is a 27 y.o. female is being seen today for follow-up non-surgical nutritional evaluation. Today we reviewed the patients current eating habits and level of physical activity, and instructed on the changes that are required for successful weight loss outcomes.    Pt's Initial Weight: 279 lbs  Weight: 255 lb (115.7 kg)  Weight loss from initial: 24  % Weight loss: 8.6 %  BMI: Body mass index is 45.17 kg/(m^2).  IBW: 115 lbs    Personal goal weight: <200 lbs    Estimated RMR (Bolivar-St Jeor equation): 1917 calories  Protein requirements (.5grams to .9grams per pound IBW, 20-30% of calories, minimum of 60-80gm per day):   grams    Progress made since last visit: Pt has made excellent progress with weight loss (- 9 lb since last visit). Since last visit, pt has started eating breakfast, limited snacking, increasing water, fruits, and vegetables into diet. Pt also reports having reduced constipation and having daily BM.     Concerns: high carbohydrate intake from fruit smoothies; no exercise routine established.     Diet Recall/Time: *Pt works midnights   Breakfast: overnight oats (20g)   Am Snack: none   Lunch: 3pm- protein shake with fruit or salad (30g)   Pm snack:broccoli with ranch and fruit smoothie (8g)   Dinner: frozen meal (20g)   HS Snack: fruit smoothie (8g)     Per Diet recall estimated protein: 80-90 grams    Meals per week away from home: none      Recommended limiting eating out to no more than 2x/week.  Patient and I reviewed the importance of eating three consistent meals per day; as well as meal timing to be spaced 4-5 hours apart.  Snack choices: 100-150 calories (1-2x/day if physically hungry), incorporating a fruit/vegetable w/ protein source.    Meal Duration: not discussed    Portion Sizes problematic? NO per patient/diet recall  Encouraged slowing meal times down, 20-30 minutes, chewing to applesauce  consistency.   To aid in proper portion control and slow meal time down discussed consuming meals off smaller plates, use toddler/children utensils and set utensils down after each bite.    Protein, vegetables/fruits, carbohydrates:   The patient and I discussed the importance of including lean/low fat protein at each meal and limiting carbohydrate intake to less than 25% of plate volume.       Vitamins/Mineral Supplementation: none    Beverages (Type/Oz. per day)  Water: 60-90 oz   Coffee: none     Discussed the importance of adequate hydration and the goal of 64+ oz of fluid daily.   The patient understands the importance of  avoiding all sweetened and alcoholic drinks, and instead choosing 64 oz plain water.    Exercise  Nothing routine established.     Pt's understands that 45-60 minutes of daily activity is an important part of weight loss success.   Encouraged pt to incorporate  strength training exercise in addition to cardiovascular exercise most days of the week.    PES statement:     1. (NC-3.3.5) Obese, class III, BMI ?40 related to physical inactivity as evidenced by Infrequent, low-duration and or low intensity physical activity; and Large amounts of sedentary activities; no structured physical activity regimen.        Intervention:  Discussion:  1. Commended pt on successful weight loss and making healthy behavior changes  2. Reviewed lean protein sources.  20-30 gm protein at 3 meals daily. 60-80 grams daily total.  3. Discussed the benefits of setting an exercise routine for further weight loss   4. Carbohydrate Countin carbohydrate choice/serving = 15-20gm total carbohydrate     Instructions/Goals:   1. Include 20-30gm protein at each meal.  2. Increase vegetable/fruit intake, by having a vegetable or fruit with each meal daily. Recommended pt to increase vegetable/fruit intake to 4-5 servings daily.  3. Increase fluid intake to 64oz daily: choose plain or calorie/alcohol-free  beverages.  4. Incorporate daily structured activity, 45-60 minutes most days of the week  5. Read food labels more consistently: keeping total fat grams <10, total sugar grams <10, fiber >3gm per serving.   6. Practice plate method: 1/2 plate lean/low fat protein source, vegetable/fruit, <25% of plate complex carbohydrates.  7. Carbohydrates from grain sources at meal times to be no more than 1 Carb Choice, ie: 15-20 gm total carbohydrate per serving  8. Practice eating off of smaller plates/bowls, chewing to applesauce consistency, taking 20-30 minutes to eat in a calm/relaxed environment without distractions of tv/email/cell phone.    Goals set by patient:  1. Cut down on fruit smoothies  2. Go back to gym- 5x/week     Monitor/Evaluation:    Pt will f/u in one month with bariatrician, and f/u in two months with RD.    Plan for next visit with RD:    Discuss healthy snack options   Review plate method   Educated pt on food labels  Review carbohydrates/fiber  Exercise      Time In: 10:00am  Time Out: 10:15am      ABN signed: Yes

## 2021-06-15 NOTE — PROGRESS NOTES
"Bariatric Clinic Follow-Up Visit:    Stefanie Cordova is a 27 y.o.  female with Body mass index is 45.17 kg/(m^2).  presenting here today for follow-up on non-surgical efforts for weight loss. Original Intake visit occurred on 8/28/17 with a weight of 279 lbs and BMI of 46.4.  Along with diet and behavior changes, she has been using phentermine to assist her weight loss goals.  See her intake visit notes for details on identified contributors to weight gain in the past.    Weight:   Wt Readings from Last 2 Encounters:   01/22/18 (!) 255 lb (115.7 kg)   01/22/18 (!) 255 lb (115.7 kg)    pounds  Height: 5' 3\" (1.6 m) (1/22/2018 10:44 AM)  Initial Weight: 279 lbs (1/22/2018 10:44 AM)  Weight: 255 lb (115.7 kg) (1/22/2018 10:44 AM)  Weight loss from initial: 24 (1/22/2018 10:44 AM)  % Weight loss: 8.6 % (1/22/2018 10:44 AM)  BMI (Calculated): 45.2 (1/22/2018 10:44 AM)  SpO2: 100 % (1/22/2018 10:44 AM)  Waist Circumference (In): 43.5 Inches (12/18/2017 11:06 AM)  Hip Circumference (In): 53 Inches (12/18/2017 11:06 AM)  Neck Circumference (In): 14.5 Inches (12/18/2017 11:06 AM)    Comorbidities:  Patient Active Problem List   Diagnosis     Obesity, Class III, BMI 40-49.9 (morbid obesity)     Depression       Current Outpatient Prescriptions:      APPLE CIDER VINEGAR ORAL, Take 1 tablet by mouth daily., Disp: , Rfl:      B INFANTIS/B ANI/B VANESSA/B BIFID (PROBIOTIC 4X ORAL), Take 1 tablet by mouth daily., Disp: , Rfl:      biotin 1 mg cap, Take 1 tablet by mouth daily., Disp: , Rfl:      FIBER, DEXTRIN, ORAL, Take 1 tablet by mouth daily., Disp: , Rfl:      pediatric multivitamin no.76 (FLINTSTONES COMPLETE) Chew, Chew 1 tablet daily., Disp: , Rfl:      phentermine (ADIPEX-P) 37.5 mg tablet, Half tablet up to one full tablet daily with breakfast., Disp: 60 tablet, Rfl: 0      Interim: Since our last visit, she has hit her 10% weight loss haris. Constipation is better.  Felt dizzy last night.     Plan:   1.  Diet: Just met " today with the dietician, is doing well with steady weight loss, now hitting a 10% weight loss level.  2. Exercise: some financial troubles lately so not paying for gym membership now, plans to restart when finances are better. encouraged to look at home fitness options.  3. Medication: phentermine going well, BP excellent today, tubal ligation for birth control.   4.  Stress Reduction:  More difficult now, license is suspended and moved to [place] recently (recommended [facility] if new site needed).   5. Goals: hit 10% weight loss today, next goal 245 lbs or less. Encouraged to set a reward.  6. Mild hypothyroidism on labs in September, never followed up. Will recheck levels and if persistent elevation of TSH (was 5.2 last check), supplementation would be warranted.  7. Low D, will recheck with TSH.  8. Constipation improved.  9. Nonspecific dizzy spell yesterday. Encouraged adequate rest/hydration. Benign exam today, heart Regular on ascultation with normal BP. If recurrent encouraged to follow up.      We discussed [facility]ariat[facility] Basics including:  -eating 3 meals daily  -eating protein first  -eating slowly, chewing food well  -avoiding/limiting calorie containing beverages  -We discussed the importance of restorative sleep and stress management in maintaining a healthy weight.  -We discussed the National Weight Control Registry healthy weight maintenance strategies and ways to optimize metabolism.  -We discussed the importance of physical activity including cardiovascular and strength training in maintaining a healthier weight and explored viable options.    Most recent labs:  Lab Results   Component Value Date    WBC 5.5 09/07/2017    HGB 12.4 09/07/2017    HCT 36.9 09/07/2017    MCV 84 09/07/2017     09/07/2017     Lab Results   Component Value Date    CHOL 152 09/07/2017     Lab Results   Component Value Date    HDL 36 (L) 09/07/2017     Lab Results   Component Value Date    LDLCALC 99  "09/07/2017     Lab Results   Component Value Date    TRIG 86 09/07/2017     No components found for: CHOLHDL  Lab Results   Component Value Date    ALT 16 09/07/2017    AST 19 09/07/2017    ALKPHOS 84 09/07/2017    BILITOT 0.4 09/07/2017     Lab Results   Component Value Date    HGBA1C 5.1 09/07/2017     Lab Results   Component Value Date    [id number] 502 09/07/2017     Lab Results   Component Value Date    [id number] 25.2 (L) 09/07/2017     Lab Results   Component Value Date    FERRITIN 38 09/07/2017     Lab Results   Component Value Date    PTH 37 09/07/2017     Lab Results   Component Value Date    23502 141 09/07/2017     Lab Results   Component Value Date    7597 32.7 09/07/2017     Lab Results   Component Value Date    TSH 4.16 09/27/2017     No results found for: TESTOSTERONE    DIETARY HISTORY    Positive Changes Since Last Visit: losing well  Struggling With: fatigue. Stress.    Knowledgeable in Reading Food Labels: yes, just met with dietician today  Getting Adequate Protein: see dietician notes  Sleeping 7-8 hours/day night shift. Reduced sleep.  Stress management see above    PHYSICAL ACTIVITY PATTERNS:  Cardiovascular: previous gym, not much in the last month  Strength Training: no    REVIEW OF SYSTEMS  GENERAL/CONSTITUTIONAL:  No illness. Did feel dizzy yesterday, resolved. No fever.  HEENT:   no ST/Ear pain  CARDIOVASCULAR:   no chest pains.  PULMONARY:   No FLANNERY.  GASTROINTESTINAL:  Constipation better.  UROLOGIC:  na  NEUROLOGIC:  Dizzy yesterday, rare headaches. None today.  PSYCHIATRIC:  Stable moods  MUSCULOSKELETAL/RHEUMATOLOGIC   na  ENDOCRINE:  Never followed up for recheck of TSH.  DERMATOLOGIC:  na    PHYSICAL EXAM:  Vitals: /65  Pulse 65  Resp 18  Ht 5' 3\" (1.6 m)  Wt (!) 255 lb (115.7 kg)  SpO2 100%  BMI 45.17 kg/m2  Height: 5' 3\" (1.6 m) (1/22/2018 10:44 AM)  Initial Weight: 279 lbs (1/22/2018 10:44 AM)  Weight: 255 lb (115.7 kg) (1/22/2018 10:44 AM)  Weight loss from " initial: 24 (1/22/2018 10:44 AM)  % Weight loss: 8.6 % (1/22/2018 10:44 AM)  BMI (Calculated): 45.2 (1/22/2018 10:44 AM)  SpO2: 100 % (1/22/2018 10:44 AM)  Waist Circumference (In): 43.5 Inches (12/18/2017 11:06 AM)  Hip Circumference (In): 53 Inches (12/18/2017 11:06 AM)  Neck Circumference (In): 14.5 Inches (12/18/2017 11:06 AM)    GEN: Pleasant, well groomed, in no acute distress  HEENT: PEERL, EOMI, airway clear. .  NECK: No swelling.  HEART: Rhythm regular, rate regular, no murmur   LUNGS: Clear without crackles or wheezes. No cough.  ABDOMEN: obese..  EXTREMITIES: 2 plus palpable peripheral pulses, radial..  NEURO: Alert and Oriented X3, normal gait and speech. Normal Clotilde of fingers, no gait ataxia. Normal finger nose finger. No nystagmus..  SKIN: No visible rashes.        25 minutes was spent in direct consultation, with over 50% of it spent in counseling regarding their plan for excess weight reduction and health modification.  Sudheer Feng MD  [facility]  10:58 AM

## 2021-06-16 PROBLEM — R87.610 ASCUS OF CERVIX WITH NEGATIVE HIGH RISK HPV: Status: ACTIVE | Noted: 2017-04-21

## 2021-06-16 PROBLEM — E07.9 THYROID DYSFUNCTION: Status: ACTIVE | Noted: 2018-02-13

## 2021-06-16 PROBLEM — Z87.42 HISTORY OF ABNORMAL CERVICAL PAP SMEAR: Status: ACTIVE | Noted: 2018-02-13

## 2021-06-16 PROBLEM — F32.A DEPRESSION: Status: ACTIVE | Noted: 2017-08-28

## 2021-06-19 NOTE — LETTER
Letter by Lilo Patino MD at      Author: Lilo Patino MD Service: -- Author Type: --    Filed:  Encounter Date: 7/10/2019 Status: (Other)         Stefanie Cordova  [address]  [place] [address]               July 10, 2019      Dear Stefanie:    We are sorry that you missed your appointment with Dr Altman and Tammy Higgins on 7/10/2019. Your health and follow-up medical care are important to us. Please call our office as soon as possible so that we may reschedule your appointment. If you have already rescheduled your appointment, please disregard this letter.    Sincerely,      [facility]

## 2021-06-20 NOTE — PROGRESS NOTES
Initial Structured Weight Loss Supervised Diet Evaluation     Assessment:  Pt. is a 28 y.o. female is being seen today for initial RD nutritional evaluation. Pt. has been unsuccessful with non-surgical weight loss methods and is interested in bariatric surgery. Today we reviewed current eating habits and level of physical activity, and instructed on the changes that are required for successful bariatric outcomes.    Pt historically pursued weight loss through non surgical weight loss program, however, pt now desires bariatric surgery d/t it being covered by her insurance and wanting a low-term way to improve her health.     Workflow review: Psych cleared, attended support group, labs complete  Weight goal: 280 lb      Pt Active Problem List Diagnosis:  Patient Active Problem List   Diagnosis     Obesity, Class III, BMI 40-49.9 (morbid obesity) (H)     Depression     Adjustment disorder with depressed mood     ASCUS of cervix with negative high risk HPV     Borderline personality disorder (H)     History of abnormal cervical Pap smear     Intermittent explosive disorder     Normal delivery at term     Thyroid dysfunction     Pt's Initial Weight: 279 lbs  Weight: 292 lb 11.2 oz (132.8 kg)  Weight loss from initial: -13.7  % Weight loss: -4.91 %  BMI: Body mass index is 51.85 kg/(m^2).    Estimated RMR ([place]-St Jeor equation): 2030 calories    Food allergies, intolerances, and Shinto customs: none     Vitamins   Educated on post-op vitamin regimen: Multi Vit + iron 2x/day, calcium citrate 500-600 mg 2x/day, 1789-1036 mcg of Sublingual B-12 daily, and 5000 IU Vitamin D3 daily.    Biggest struggle with weight loss: skips meals, dislikes breakfast    Diet/Weight History  Method or Diet: non surgical program  # loss(-) or gain(+):-20 lbs     Who does the grocery shopping for your household? Pt   Who prepares your meals at home? Pt     Diet Recall/Time: Pt wakes up 6:30am   Breakfast: 8:00am- protein shake (30g)    Am Snack: none   Lunch: none or vegetables (0g)   Pm snack: none   Dinner: baked chicken, mashed potatoes, broccoli (20g)   HS Snack: none     Per Diet recall estimated protein: 50 grams     Fats used at home: none    Fried Foods: none times per week    Meals per week away from home: none       Recommended limiting eating out to no more than 2x/week.  Patient and I reviewed the importance of eating three consistent meals per day; as well as meal timing to be spaced 4-5 hours apart.  Snack choices: 100-150 calories (1-2x/day if physically hungry), incorporating a fruit/vegetable w/ protein source.    Portion Sizes problematic? Yes per patient/diet recall  Encouraged slowing meal times down, 20-30 minutes, chewing to applesauce consistency.   To aid in proper portion control and slow meal time down discussed consuming meals off smaller plates, use toddler/children utensils and set utensils down after each bite.    Protein, vegetables/fruits, carbohydrates:   Reviewed lean protein sources today. Recommended consuming 15-20gm protein at 3 meals daily    Beverages (Type/Oz. per day)  Water: 90 oz     Discussed the importance of adequate hydration after surgery and the goal of 64+ oz. of fluid daily.   The patient understands the importance of avoiding all carbonated, caffeinated and sweetened drinks; instead choosing 64 oz. plain water.    Fluid-meal separation:  The patient and I reviewed the anatomy of the bypass and why  fluids from a meal is so important.    Exercise  Pt goes to gym 1x/week.     Pt. s understands that 30-60 minutes of daily activity is an important part of bariatric surgery success.   Encouraged pt. to incorporate upper body strength training exercise, even if its lifting soup cans while watching TV at night, doing pushups/sit-ups, and abdominal work.    PES statement:   1. (NI-1.3)Excessive energy intake related to Food and nutrition related knowledge deficit concerning excessive  energy/oral intake as evidenced by Intake of high caloric density foods at meals and/or snacks; large portions; Estimated intake that exceeds estimated daily energy intake; and BMI 51.      Intervention  Discussion:    1. Educated pt on the [place] to Bariatric Success handout.  2. Reviewed lean protein sources today. Recommended consuming 15-20gm protein at 3 meals daily  3. Gave food journal homework, to be completed for f/u appointment.  4. Bariatric Plate: The patient and I discussed the importance of including lean/low  fat protein at each meal and limiting carbohydrate intake to less  than 25% of plate volume.    Instructions/Goals:     1. Include 15-20gm lean protein at each meal.  2. Increase vegetable/fruit intake, by having a vegetable or fruit with each meal daily. Recommended pt to increase vegetable/fruit intake to 4-5 servings daily.  3. Increase fluid intake to 64oz daily: choose plain or calorie/carbonation-free beverages.  4. Incorporate daily structured activity, 30-60 minutes most days of the week  5. Fill out food journal and bring to next month's visit.  6. Practice plate method: 1/2 plate lean/low fat protein source, vegetable/fruit, <25% of plate complex carbohydrates.  7. Read food labels more consistently: keeping total fat grams <10, total sugar grams <10, fiber >3gm per serving.  8. Separate fluids 30 minutes before/after meal times.  9. Practice eating off of smaller plates/bowls, chewing to applesauce consistency, taking 20-30 minutes to eat in a calm/relaxed environment without distractions of tv/email/cell phone.    Handouts Provided:  Pt. brought [facility] Bariatric [facility] Patient Handbook  Food journal       Monitor/Evaluation:  Pt. s target weight: no gain from initial visit, pt. verbalized understanding.     Has realistic expectations for weight loss: Yes  Verbalizes understanding of dietary changes post procedure: No  Verbalizes understanding of supplement needs: Yes  Verbalizes  willingness to participate in physical activity: Yes  Motivation for change: average  Client s predicted compliance on a scale of 1 (low) to 10 (high): 7  RD s prediction for client success and compliance on a scale of 1 (low) to 10 (high):  7    Plan for next visit:   Review Bariatric plate and food journal homework.  Educate on dumping syndrome and reading food labels.  (Final Supervised Diet visit with RD) pre/post-op  diet progression, give review of surgery process.  Review [place] to Bariatric Success  Check Understanding Quiz    Time In: 3:30pm  Time Out: 3:45pm    ABN signed: Yes

## 2021-06-20 NOTE — PROGRESS NOTES
I met with Stefanie while she was in the clinic to meet with Dr. Feng.  Patient initially started the surgical path a year ago and determined then that it wasn't the right time for her.  She has seen the dietician several times and has now decided to restart the surgical program.  I had referred her to  for their phone program last summer.  I have provided her their number and asked her to reach out to them to get started again with her phone calls.  We set up visits for psych and nutrition.  She is hoping to get to the support group meeting on Tuesday.  She does understand that even though she has had several visits with the dietician, she will still need to be considered a good candidate by the dietician before proceeding to a surgical consult.

## 2021-06-20 NOTE — PROGRESS NOTES
Attended support group. Workflow updated.    Jitendra Dickson [facility]  P: [phone]  F: [phone]

## 2021-06-20 NOTE — PROGRESS NOTES
Outpatient Bariatric Medicine Progress Note-Structured Weight Loss   Indication: Medical bariatric therapy to precede bariatric surgery. She comes in today to restart the surgical program after initially starting one year ago, then switching to non surgical but then failing to follow up after a good 10% weight reduction last January of 2018 when she was down to 255 lbs.    Surgery:  Sleeve Gastrectomy    Surgeon: Dr. Mccollum    Impression     28 y.o. female with Body mass index is 50.84 kg/(m^2). in the setting of morbid obesity which satisfies the NIH criteria for bariatric surgery.  Her recent social situation/loss of job and living with relatives with lack of structure has led to convenience/comfort eating and resulting weight gain in her sedentary lifestyle.   We'll get her back into the program and discussed the importance of establishing a new job, income, improved diet and coping mechanisms to be a successful candidate with the surgical tool.     Comorbidities:  Patient Active Problem List   Diagnosis     Obesity, Class III, BMI 40-49.9 (morbid obesity) (H)     Depression     Adjustment disorder with depressed mood     ASCUS of cervix with negative high risk HPV     Borderline personality disorder     History of abnormal cervical Pap smear     Intermittent explosive disorder     Normal delivery at term     Thyroid dysfunction       Plan   Weight will need to be under 280 lbs prior to the 2 week pre-operative diet.  Heparin Protocol: standard  CPAP: none  Actigall: yes  Exercise Plan: walking/self   Contraception Plan: tubal ligation  Agrees to take vitamins for Life: yes  Agrees to follow up for life: yes  Stress Management Plan: interviewing for new job. Currently living w/ family.  Medication Management: na    Past Surgical History        Past Surgical History:   Procedure Laterality Date     TUBAL LIGATION       TYMPANOSTOMY TUBE PLACEMENT Bilateral        Family History, Social History   Reviewed as  documented. See time and date confirmation.    Interim History/Pre-op needs list    Initial Labs Complete and Reviewed: will order updated labs  Dietitian Visits Initiated/cleared: ordered  Weight Change since initial weight: has gained since last seen in January  Psychologist visits initiated/cleared: will get started  HCM UTD:    Pap: needs updating due to ASCUS in 2017, neg HPV   Mammogram: na   Colonscopy:  na   Other: NA  Sugars Well Controlled: yes  Cardiac: na  Pulmonary: na  Hormone use: none.   Will need some social stability/job/income prior to clearance.    Medications and Allergies      Current Outpatient Prescriptions   Medication Sig Dispense Refill     B INFANTIS/B ANI/B VANESSA/B BIFID (PROBIOTIC 4X ORAL) Take 1 tablet by mouth daily.       cholecalciferol, vitamin D3, (VITAMIN D3) 5,000 unit Tab Once daily 90 each 2     cyclobenzaprine (FLEXERIL) 10 MG tablet Take 10 mg by mouth as needed.       FIBER, DEXTRIN, ORAL Take 1 tablet by mouth daily.       meloxicam (MOBIC) 15 MG tablet Take 15 mg by mouth as needed.       pediatric multivitamin no.76 (FLINTSTONES COMPLETE) Chew Chew 1 tablet daily.       No current facility-administered medications for this visit.      Allergies: Review of patient's allergies indicates no known allergies.    Habits   Tobacco Use: not since 2017  Alcohol Use: rare  NSAIDS: no   Caffeine: none  SLEEP: 6-8 hours  CPAP: na  PHYSICAL ACTIVITY PATTERNS:  Cardiovascular: minimal currently will start walking  Strength Training: will start some 7min workout tols  STRESS MANAGEMENT PATTERNS:   Will work w/ psychology    Dietary History   SInce losing her job and moving in with family has relied more heavily on fast/convenience eating with resultant weight gain.     Review of Systems     GENERAL/CONSTITUTIONAL:  Fatigue: some  HEENT:  Dental Pain: no  Trouble Swallowing: no  CARDIOVASCULAR:  Chest pain with exertion: o  PULMONARY:  CPAP Use: no  Asthma Controlled:  "no  GASTROINTESTINAL:  GERD/Heartburn: rarely.  Gallbladder: intact.  UROLOGIC:  Urinary Symptoms: no  NEUROLOGIC:  Headaches: no  Paresthesias: no  PSYCHIATRIC:  Moods: see above  MUSCULOSKELETAL/RHEUMATOLOGIC  Arthralgias: no  Myalgias: no  ENDOCRINE:  Monitoring Blood Sugars: no  Sugars Well Controlled: yes  DERMATOLOGIC:  Rashes: no  Physical Exam   Vitals: /79 (Patient Site: Right Arm, Patient Position: Sitting, Cuff Size: Adult Large)  Pulse 69  Ht 5' 3\" (1.6 m)  Wt (!) 287 lb (130.2 kg)  SpO2 99%  BMI 50.84 kg/m2  Body mass index is 50.84 kg/(m^2).  GENERAL: appears her stated age. Ambulatory.  HEENT: PEERLA, EOMI, teeth adequate, airway clear, mallampati 1   NECK: no carotid bruits, no anterior/supraclavicular lymphadenopathy, thyroid normal.  HEART: Rhythm regular, rate regular, no murmur  LUNGS: Clear, no wheezing. Good excursion  ABDOMEN: soft, non-tender, obese,no rashes, surgical scars not visible.   MUSCULOSKELETAL: no obvious deformities  VASCULAR: palpable peripheral pulses, no  lower extremity edema, no color changes of venous stasis, no ulcerations  SKIN: Rashes: no rash/pallor or jaundice.    Counseling     We discussed the National Weight Control Registry and Healthy Weight Maintenance Strategies.   We discussed ways to optimize her metabolism.  We discussed specific exercise goals and dietary habits conducive to a healthy weight.  We discussed the importance of lifelong vitamin supplementation and the potential medical complications of vitamin non-compliance.  We discussed the importance of restorative sleep and stress management in maintaining a healthy weight.  I reminded her to avoid alcohol for 1 year post-operatively, to avoid NSAIDS for life unless specifically indicated and used with a concomitant PPI, to avoid caffeine in excess, and explained the importance of lifelong tobacco abstinence.  40 minutes spent with patient >50% in counseling.    Sudheer Feng MD  [facility]  9/6/2018  11:42 AM

## 2021-06-20 NOTE — PROGRESS NOTES
Workflow updated with online seminar completed on 9/6/2018.  Cleared by Psych.  Patient needs to update her initial labs and have clearance from the RD.  Arcelia Becker RN

## 2021-06-20 NOTE — PROGRESS NOTES
Patient reports she has dysthymia that is well controll at this time.  She is working on her eating behaviors.  She will take MMPI, QOLI and mAST priro to our next appt.

## 2021-06-21 NOTE — PROGRESS NOTES
Orders placed for pre-op testing which pt plans to have done at her PCP clinic, [facility] in [place], on 12/4/2018 when she is there for her pre-op H&P.  Pt will also have her TSH level rechecked at that visit.  Prescriptions for Actigall and Omeprazole to be started after surgery sent to pt's pharmacy.     Rani John RN, N  [facility]  P [phone]  F [phone]

## 2021-06-21 NOTE — PROGRESS NOTES
I have submitted a prior authorization request on behalf of this patient to  to be approved for a LSG with Dr. Saulo Mccollum.

## 2021-06-21 NOTE — PROGRESS NOTES
Follow Up Surgical Weight Loss Supervised Diet Evaluation  Assessment:  Pt presents for follow up supervised diet visit with RD.    This patient is a 28 y.o.  She is being seen today for follow-up nutritional evaluation. Stefanie Cordova has been unsuccessful with non-surgical weight loss methods and is interested in bariatric surgery. Today we reviewed the patients current eating habits and level of physical activity, and instructed on the changes that are required for successful bariatric outcomes.    Workflow review: psych cleared, attended support group, labs complete.  Weight goal: 280 lb     Pt Active Problem List Diagnosis:  Patient Active Problem List   Diagnosis     Obesity, Class III, BMI 40-49.9 (morbid obesity) (H)     Depression     Adjustment disorder with depressed mood     ASCUS of cervix with negative high risk HPV     Borderline personality disorder (H)     History of abnormal cervical Pap smear     Intermittent explosive disorder     Normal delivery at term     Thyroid dysfunction     Pt's Initial Weight: 279 lbs  Weight: 285 lb (129.3 kg)  Weight loss from initial: -6  % Weight loss: -2.15 %  Body mass index is 50.49 kg/(m^2).    Calculated RMR ([place]-St Jeor equation): 2030 calories    Progress made since last visit: Food journal completed. Since last visit, eating smaller portions and getting in exercise. Pt also  fluids.   Concerns: Not at goal weight    Diet Recall/Time:   Breakfast: protein shake, berries (20g)   Am Snack: none   Lunch: cottage cheese, spinach salad (30g)  Pm snack: none   Dinner: chicken, veg, rice (20g)  HS Snack: none     Protein: 80 grams    Typical Snacks or snack times: none   Meals per week away from home: none      Recommended limiting eating out to no more than 2x/week.  Patient and I reviewed the importance of eating three consistent meals per day; as well as meal timing to be spaced 4-5 hours apart.  Snack choices: 100-150 calories (1-2x/day if  physically hungry), incorporating a fruit/vegetable w/ protein source.    Meal Duration:20 minutes  Encouraged slowing meal times down, 20-30 minutes, chewing to applesauce consistency.     Portion Sizes problematic? Yes Per patient/diet recall   To aid in proper portion control and slow meal time down discussed consuming meals off smaller plates, use toddler/children utensils and set utensils down after each bite.    Protein, vegetables/fruits, carbohydrates:   The patient and I discussed the importance of including lean/low fat protein at each meal and limiting carbohydrate intake to less than 25% of plate volume.     Vitamins   Post-op vitamin regimen: Multi Vit + iron 2x/day, calcium citrate 500-600 mg 2x/day, 6450-6786 mcg of Sublingual B-12 daily, and 5000 IU Vitamin D3 daily.    Beverages (Type/Oz. per day)  Water: 3 water bottles a day (64 oz)   Coffee: none   Tea: none   Milk: periodically with shakes   Regular soda: none   Diet soda: none   Juice: none   Sudarshan-Aid/lemonade/etc: none   Alcohol: none     Discussed the importance of adequate hydration after surgery and the goal of 64+ oz of fluid daily.   The patient understands the importance of avoiding all carbonated, caffeinated and sweetened drinks; and instead choose 64 oz plain water.    Fluid-meal separation:   Pt is working on  fluids 30min before and 30 minutes after meals.  Fluids are  30min before and 30 minutes after meals.    Exercise  Walking mile and arm weights. Pt also goes on stationary bike.     Pt's understands that 30-60 minutes of daily activity is an important part of bariatric surgery success.   Encouraged pt to incorporate upper body strength training exercise, even if its lifting soup cans while watching TV at night, doing pushups/sit-ups, and abdominal work.    PES statement:   1. (NI-1.3)Excessive energy intake related to Food and nutrition related knowledge deficit concerning excessive energy/oral intake as  evidenced by Intake of high caloric density foods at meals and/or snacks; large portion;Estimated intake that exceeds estimated daily energy intake; Frequent excessive fast food or restaurant intake; and BMI 50.     Intervention:  Discussion:   1. Reviewed the [place] to Bariatric Success handout.  2. Reviewed lean protein sources and recommended to consume 15-20gm protein at 3 meals daily.  3. Gave pt Check Your Understanding Quiz, and assessed readiness for change.  4. Educated on pre/post-op diet progression, post-op vitamin regimen, gave review of surgery process.  5. Dumping syndrome: choosing foods with less than 5-10gm fat/sugar per serving to avoid dumping syndrome for [facility] pts.  6. Reviewed Bariatric plate and food journal homework.  7. Bariatric Plate: The patient and I discussed the importance of including lean/low  fat protein at each meal and limiting carbohydrate intake to less  than 25% of plate volume.  Instructions/Goals:   1. Include 15-20gm protein at each meal.  2. Increase vegetable/fruit intake, by having a vegetable or fruit with each meal daily. Recommended pt to increase vegetable/fruit intake to 4-5 servings daily.  3. Increase fluid intake to 64oz daily: choose plain or calorie/carbonation-free beverages.  4. Incorporate daily structured activity, 30-60 minutes most days of the week  5. Practice plate method: 1/2 plate lean/low fat protein source, vegetable/fruit, <25% of plate complex carbohydrates.  6. Read food labels more consistently: keeping total fat grams <10, total sugar grams <10, fiber >3gm per serving.  7. Separate fluids 30 minutes before/after meal times.  8. Practice eating off of smaller plates/bowls, chewing to applesauce consistency, taking 20-30 minutes to eat in a calm/relaxed environment without distractions of tv/email/cell phone.    Handouts provided:  Pt. [facility] Bariatric [facility] Patient Handbook    Monitor/Evaluation:     Pt understands the importance of not  gaining any weight from initial recorded weight.      Has realistic expectations for weight loss: Yes  Verbalizes understanding of dietary changes post procedure: Yes  Verbalizes understanding of supplement needs: Yes  Verbalizes willingness to participate in physical activity: Yes  Motivation for change: average  Client s predicted compliance on a scale of 1 (low) to 10 (high): 7  RD s prediction for client success and compliance on a scale of 1 (low) to 10 (high):  7    Pt has made the necessary changes, and is knowledgeable and well-informed of the dietary and physical activity requirements that are necessary for successful bariatric outcomes. This Pt is an appropriate candidate for surgery from a nutrition standpoint at this time. The patient understands that surgery is a tool, and not a cure, and our aftercare program must be followed.    Time In: 1:00pm  Time Out: 1:30pm      ABN signed: Yes

## 2021-06-21 NOTE — PROGRESS NOTES
Patient attended group class to review pre-op instructions and dietary plan for upcoming surgery.    Pt will begin 2-week pre-op liquid diet 11/27/2018, will do clear liquids the day before surgery. Pt is scheduled for Gastric Sleeve on 12/11/2018, and will then follow 2 week post-op liquid diet. Pt will f/u with RD 1-week post-op for further diet advancement.    Educated pt on 2-week pre-op and 1-week post-op liquid diets. Discussed appropriate liquids and demonstrated portions for each of the food groupings during each diet phase. Reviewed appropriate calories/protein/fluid goals during 2-week pre-op liquid diet. Educated on correct vitamins/minerals to take after surgery in correct dosage and frequency. Provided grocery list and sample menu plan for each diet stage, as well as unflavored protein powder samples.       Discussed admission process and hospital course.  Pharmacy information packet given and explained. Patient was given exercises to work on post-op for maintaining muscle mass and strengthening that was created by [facility].  Bariatric quiz given to patient for a review on their own.  Discharge instructions, information card and follow up appointments given and reviewed with pt at this time and patient verbalized understanding. She will have her H&P on 12/4/2018 and do her  pre-op testing during that visit with her primary.  Orders sent with the patient.  Prescriptions for Omeprazole and Actigall sent to the patient's pharmacy to be started after surgery.      Arcelia Becker RN, CBN

## 2021-06-21 NOTE — PROGRESS NOTES
HPI: Stefanie Cordova is a 28 y.o. female here today for consideration of metabolic and bariatric surgery. She is referred by Dr. Blanco.  She has struggled with obesity for the past 15 years, noting her initial difficulty starting in her early teenage years.  She began see my colleague Dr. Feng last fall, at which point she was started on the medical weight management pathway with phentermine.  She actually had decent results with this to start, getting her weight down to 255 pounds, before she lost her job and was unable to afford the medication and had regain of all of her weight up to her current size.  She notes over the course the past year she has had an opportunity to think about weight loss surgery in more depth, and would like to pursue it now given her failure with medical weight management.    She reports her goal with weight loss surgery is to achieve a weight below 200, and unable greater physical activity to play with her children and be more active.  She also hopes to soni off potential health issues in the future by controlling her weight better now.  She reports that she is only had one previous abdominal operation, a tubal ligation which she is hoping to have reversed as she wants to have more children.    Her bariatric comorbidities include depression.    Bariatric comorbidities not present include hypertension, type 2 diabetes, obstructive sleep apnea, gastroesophageal reflux disease, osteoarthritis.      Allergies:Review of patient's allergies indicates no known allergies.    Past Medical History:   Diagnosis Date     Alcoholism (H)     had some treatment court ordered around age 16.     Depression     in her teens hospitalized around 17yo, wrist cutting.      Disease of thyroid gland     temporary meds during her pregnancy, now resolved as of her last check.     Urinary incontinence     mild stress incontinence       Past Surgical History:   Procedure Laterality Date     TUBAL LIGATION        "TYMPANOSTOMY TUBE PLACEMENT Bilateral        CURRENT MEDS:  Current Outpatient Prescriptions   Medication Sig Dispense Refill     cholecalciferol, vitamin D3, (VITAMIN D3) 5,000 unit Tab Once daily 90 each 2     pediatric multivitamin no.76 (FLINTSTONES COMPLETE) Chew Chew 1 tablet daily.       cyclobenzaprine (FLEXERIL) 10 MG tablet Take 10 mg by mouth as needed.       LUTERA, 28, 0.1-20 mg-mcg per tablet        meloxicam (MOBIC) 15 MG tablet Take 15 mg by mouth as needed.       No current facility-administered medications for this visit.          Family History   Problem Relation Age of Onset     Obesity Mother      Obesity Maternal Aunt      Obesity Paternal Aunt      Obesity Paternal Uncle      Hypertension Father      Hyperlipidemia Father      Asthma Daughter      Cancer Maternal Grandmother      Stroke Paternal Grandmother      Heart disease Paternal Grandmother         reports that she quit smoking about 17 months ago. She has never used smokeless tobacco. She reports that she drinks alcohol. She reports that she does not use illicit drugs.    Review of Systems -  A complete ROS was reviewed and except for what is listed in the HPI above, all others are negative  PSYCHIATRIC: She has undergone a lifestyle assessment and has been deemed a good candidate for bariatric surgery by the psychologist.    /81 (Patient Site: Right Arm, Patient Position: Sitting, Cuff Size: Adult Large)  Pulse 80  Ht 5' 3\" (1.6 m)  Wt (!) 285 lb (129.3 kg)  SpO2 99%  Breastfeeding? No  BMI 50.49 kg/m2  Wt Readings from Last 3 Encounters:   11/08/18 (!) 285 lb (129.3 kg)   11/05/18 (!) 285 lb (129.3 kg)   10/09/18 (!) 292 lb 11.2 oz (132.8 kg)     Body mass index is 50.49 kg/(m^2).    EXAM:  GENERAL: This is a well-developed 28 y.o. female who appears her stated age  HEAD & NECK: Grossly normal.  No visible goiter or scars  CARDIAC: RRR without murmur  CHEST/LUNG:  Clear to auscultation  ABDOMEN: Obese.  Nontender.  No " hernias or masses appreciated.  LYMPHATIC:  No significant adenopathy appreciated.    EXTREMITIES: Grossly normal.  No evidence of chronic venous stasis.    NEUROLOGIC: Focally intact  INTEGUMENT: No open lesions or ulcers  PSYCHIATRIC: Normal affect. She has a good grasp on the nature of her obesity and the treatment options.    LABS:  Lab Results   Component Value Date    WBC 6.2 11/08/2018    HGB 12.6 11/08/2018    HCT 36.6 11/08/2018    MCV 84 11/08/2018     11/08/2018     INR/Prothrombin Time      Lab Results   Component Value Date    HGBA1C 5.1 11/08/2018     Lab Results   Component Value Date    ALT 16 09/07/2017    AST 19 09/07/2017    ALKPHOS 84 09/07/2017    BILITOT 0.4 09/07/2017       Assessment/Plan: 28 y.o. female who is an excellent candidate for bariatric and metabolic surgery.  After a careful conversation with the patient it was decided that a lap or scopic sleeve gastrectomy would be her best option given her desire to become pregnant in the future, and lack of significant obesity related comorbidities currently.       I went over the surgery in detail with her.  I went over the nature of the operation and some of the potential consequences of the surgery.  I went over the expected hospital course and discussed laparoscopic versus open surgery, understanding that we will plan on doing this laparoscopically with the possibility of having to convert to an open operation.  I went over some of the risks and complications of the operation including, but not limited to, DVT, pulmonary emboli, pneumonia, postoperative bleeding, wound infection, staple line leak, intra-abdominal sepsis, and possible death.  I also went over some of the potential nutritional concerns such as vitamin B-12, iron, vitamin D, vitamin A, calcium and protein deficiencies.  I will also went over the need for lifelong nutritional surveillance.  The patient understands and wants to proceed with surgery.  We will submit for  prior authorization.      Saulo Mccollum MD  [facility] Department of Surgery

## 2021-06-22 NOTE — PROGRESS NOTES
"HPI: Pt is here for follow up of a laparoscopic sleeve gastrectomy on December 11. She is doing well. Taking po well, estimating she is getting in 64+ oz of fluid. No vomiting.  No pain with drinking or eating purees. No fevers or chills. Ambulating without problems.     Current Outpatient Medications   Medication Sig Dispense Refill     cholecalciferol, vitamin D3, 5,000 unit Tab Take 5,000 Units by mouth daily.       levothyroxine (SYNTHROID) 25 MCG tablet Take 1 tablet (25 mcg total) by mouth daily. 90 tablet 0     pediatric multivitamin no.76 (FLINTSTONES COMPLETE) Chew Chew 2 tablets daily .             ursodiol (ACTIGALL) 300 mg capsule Take 1 capsule (300 mg total) by mouth 2 (two) times a day Start 2 wks after surgery. Do not cut, crush or open. Take with warm liquids.. 180 capsule 1     LUTERA, 28, 0.1-20 mg-mcg per tablet        omeprazole (PRILOSEC) 20 MG capsule Take 1 capsule (20 mg total) by mouth daily For the first 6 weeks, open capsule & sprinkle contents into liquids or onto food.. 90 capsule 0     No current facility-administered medications for this visit.          /58 (Patient Site: Right Arm, Patient Position: Sitting, Cuff Size: Adult Large)   Pulse 60   Ht 5' 3\" (1.6 m)   Wt (!) 268 lb 8 oz (121.8 kg)   LMP 12/04/2018   SpO2 98%   Breastfeeding? No   BMI 47.56 kg/m    Wt Readings from Last 3 Encounters:   12/27/18 (!) 268 lb 8 oz (121.8 kg)   12/11/18 (!) 277 lb 4.8 oz (125.8 kg)   11/19/18 (!) 293 lb (132.9 kg)     Body mass index is 47.56 kg/m .    EXAM:  GENERAL:Appears well  ABDOMEN: Incisions healing well      Assessment/Plan: Pt s/p laparoscopic sleeve gastrectomy on December 11. Doing well. Diet and activity discussed. She will f/u with us at the [facility] in 1 month to meet with our dietician, and again at 3 months for additional follow up.    Saulo Mccollum MD  [facility] Department of Surgery  "

## 2021-06-22 NOTE — PROGRESS NOTES
Time in: 10:00a   /Time out: 11:00a     BMI: There is no height or weight on file to calculate BMI.   Pt presents for 1 week post op dietitian follow up. Reports tolerating full liquids well. Denies n/v, constipation/diarrhea, or significant pain. Taking MVI and SL B12 appropriately. Taking 32-48 oz fluid/day. Educated pt on pureed and soft to bariatric regular diets. Provided grocery list and sample meal plan for each diet stage.  Pt will begin pureed diet on 12/25/2018 and advance as tolerated to softs/bariatric regular on 1/15/2019. Instructed pt to begin 500 mg calcium citrate BID and 5000IU Vitamin D3 on 1/15/2019. Pt to follow up with RD at 3 months post op.

## 2021-06-22 NOTE — PROGRESS NOTES
Preop labs look adequate to proceed with surgery, mild elevation of PTT unlikely to be of consequence. Urine was contaminated and not in need of treatment. Remainder of CBC, CMP normal as was INR.   Sudheer Feng MD

## 2021-06-22 NOTE — ANESTHESIA CARE TRANSFER NOTE
Pt breathing spontaneously, follows commands, suctioned extubated. Spontaneous respirations with SFM to PACU. VSS.      Last vitals:   Vitals:    12/11/18 0839   BP: 129/75   Pulse: 60   Resp: 16   Temp: 36.8  C (98.3  F)   SpO2: 100%     Patient's level of consciousness is drowsy  Spontaneous respirations: yes  Maintains airway independently: yes  Dentition unchanged: yes  Oropharynx: oropharynx clear of all foreign objects    QCDR Measures:  ASA# 20 - Surgical Safety Checklist: WHO surgical safety checklist completed prior to induction    PQRS# 430 - Adult PONV Prevention: 4558F - Pt received => 2 anti-emetic agents (different classes) preop & intraop  ASA# 8 - Peds PONV Prevention: NA - Not pediatric patient, not GA or 2 or more risk factors NOT present  PQRS# 424 - Debra-op Temp Management: 4559F - At least one body temp DOCUMENTED => 35.5C or 95.9F within required timeframe  PQRS# 426 - PACU Transfer Protocol: - Transfer of care checklist used  ASA# 14 - Acute Post-op Pain: ASA14B - Patient did NOT experience pain >= 7 out of 10

## 2021-06-22 NOTE — ANESTHESIA PREPROCEDURE EVALUATION
Anesthesia Evaluation        Airway   Mallampati: II  Neck ROM: full  Comment: Thick neck    Pulmonary - negative ROS    breath sounds clear to auscultation                         Cardiovascular   (+) , hypercholesterolemia,     (-) hypertension  Rhythm: regular  Rate: normal,         Neuro/Psych      Comments: Borderline personality disorder, intermittent explosive disorder    Endo/Other    (+) hypothyroidism (s/f levothyroxine ), obesity (Morbidly obese, s/f sleeve gastrectomy),      GI/Hepatic/Renal            Dental - normal exam                        Anesthesia Plan  Planned anesthetic: general endotracheal  GETA- glidescope  Pt declines ITN  Pre-op PO meds per surgery  Ketamine 50mg post-induction, toradol 15mg if okay per surgeon  Precedex ggt 0.5, Magnesium 1g/hr infusion x4 hr  Fluids per ERAS protocol   Consented for pre-emergence TAP block   ASA 3   Induction: intravenous   Anesthetic plan and risks discussed with: patient  Anesthesia plan special considerations: antiemetics, dexmedetomidine  Post-op plan: routine recovery

## 2021-06-22 NOTE — ANESTHESIA POSTPROCEDURE EVALUATION
Patient: Stefanie Cordova  LAPAROSCOPIC SLEEVE GASTRECTOMY  Anesthesia type: general    Patient location: PACU  Last vitals:   Vitals:    12/11/18 1533   BP: 132/75   Pulse: (!) 53   Resp: 16   Temp: 37.1  C (98.8  F)   SpO2: 97%     Post vital signs: stable  Level of consciousness: awake and responds to simple questions  Post-anesthesia pain: pain controlled  Post-anesthesia nausea and vomiting: no  Pulmonary: unassisted, return to baseline  Cardiovascular: stable and blood pressure at baseline  Hydration: adequate  Anesthetic events: no    QCDR Measures:  ASA# 11 - Debra-op Cardiac Arrest: ASA11B - Patient did NOT experience unanticipated cardiac arrest  ASA# 12 - Debra-op Mortality Rate: ASA12B - Patient did NOT die  ASA# 13 - PACU Re-Intubation Rate: ASA13B - Patient did NOT require a new airway mgmt  ASA# 10 - Composite Anes Safety: ASA10A - No serious adverse event    Additional Notes:

## 2021-07-03 NOTE — ADDENDUM NOTE
Addendum Note by Sudheer Feng MD at 2/15/2018  2:52 PM     Author: Sudheer Feng MD Service: -- Author Type: Physician    Filed: 2/15/2018  2:52 PM Encounter Date: 2/14/2018 Status: Signed    : Sudheer Feng MD (Physician)    Addended by: SUDHEER FENG. on: 2/15/2018 02:52 PM        Modules accepted: Orders

## 2021-08-03 PROBLEM — E66.01 MORBID OBESITY DUE TO EXCESS CALORIES (H): Status: RESOLVED | Noted: 2018-12-11 | Resolved: 2020-09-24

## 2021-08-03 PROBLEM — E66.01 OBESITY, CLASS III, BMI 40-49.9 (MORBID OBESITY) (H): Status: RESOLVED | Noted: 2017-08-28 | Resolved: 2020-09-24

## 2021-08-06 NOTE — PROGRESS NOTES
Assessment/Plan:     1. Obesity, Class III, BMI 40-49.9 (morbid obesity)  Comprehensive Metabolic Panel    HM2(CBC w/o Differential)    Lipid Profile    Vitamin D, Total (25-Hydroxy)    Parathyroid Hormone Intact    Thyroid Stimulating Hormone (TSH)    Vitamin A    Vitamin B12    Thiamin (Vitamin B1), Whole Blood    Ferritin    Zinc, Serum    Folate, Serum    Glycosylated Hemoglobin A1c   2. Health care maintenance  Rubeola Immune Status, IgG    Mumps Immune Status Antibody, IgG    Rubella Immune Status (IgG)    Hepatitis B Surface Antibody (Anti-HBs) Vaccine Check       Diagnoses and all orders for this visit:    Obesity, Class III, BMI 40-49.9 (morbid obesity)  -     Comprehensive Metabolic Panel  -     HM2(CBC w/o Differential)  -     Lipid Profile  -     Vitamin D, Total (25-Hydroxy)  -     Parathyroid Hormone Intact  -     Thyroid Stimulating Hormone (TSH)  -     Vitamin A  -     Vitamin B12  -     Thiamin (Vitamin B1), Whole Blood  -     Ferritin  -     Zinc, Serum  -     Folate, Serum  -     Glycosylated Hemoglobin A1c  -Labs above as ordered by her provider at the bariatric clinic.  She will follow the plan outlined by the bariatric clinic.  Hoping to have surgery in November or December of this year.  Encouraged regular exercise, healthy diet and weight loss efforts.    Health care maintenance  -     Rubeola Immune Status, IgG  -     Mumps Immune Status Antibody, IgG  -     Rubella Immune Status (IgG)  -     Hepatitis B Surface Antibody (Anti-HBs) Vaccine Check  -We will check hepatitis B titer as our records indicates she has only had 2 hepatitis B vaccines.  We will also check measles, mumps and rubella immune status due to concerns about waning immunity.  Recommend influenza vaccine but she declines at this time.             The following high BMI interventions were performed this visit: encouragement to exercise    Subjective:      Stefanie SOUSA Art is a 27 y.o. female who comes in today to establish  care.  Recently transferred to [facility] from the [facility].  Had a recent appointment at the bariatric clinic.  Is hoping to have the bariatric surgery for treatment of morbid obesity.  Has several lab orders that she would like to have drawn today.  These are part of her initial evaluation at the bariatric clinic.  She also needs to establish care with a primary care provider.  She has no new concerns or questions that she would like to discuss today.  She reports that she had a Pap smear in 2017.  She is up-to-date on her vaccines as far as she knows.  We were able to find records of most of her vaccines and entered them into the chart.  Records only show 2 doses of the hepatitis B vaccine series.  She did spend some time living in [place] so is not sure if she had any vaccines there.  Medical history is reviewed.  History of depression and anxiety as well as alcoholism.  She did go through treatment for alcoholism when she was a teenager.  She reports that she rarely drinks alcohol now.  She is not currently on any antidepressant or anxiety medication.  Review of care everywhere shows that she did have an office visit in April and was prescribed sertraline.  She did not start taking the medication.  Feels that her mood symptoms are manageable.  She also has history of hypothyroidism during her pregnancy.  It took levothyroxine during that time but thyroid tests returned to normal and she was taken off the medications following her pregnancy.  She has had  in the past.  Has also had tympanostomy tube placement and a tubal ligation.  Family history is reviewed and entered into the chart.  She has 3 children ages 7, 4 and 2.  Both parents are alive and healthy.  Several family members with obesity.  She is not a smoker.  No drug use.  Review of systems otherwise negative.  She takes a pediatric multivitamin.  She takes no other regular medications.  No other concerns or questions today.    Current  "Outpatient Prescriptions   Medication Sig Dispense Refill     pediatric multivitamin no.76 (FLINTSTONES COMPLETE) Chew Chew 1 tablet daily.       No current facility-administered medications for this visit.        Past Medical History, Family History, and Social History reviewed.  Past Medical History:   Diagnosis Date     Alcoholism     had some treatment court ordered around age 16.     Depression     in her teens hospitalized around 17yo, wrist cutting.      Disease of thyroid gland     temporary meds during her pregnancy, now resolved as of her last check.     Urinary incontinence     mild stress incontinence     Past Surgical History:   Procedure Laterality Date     TUBAL LIGATION       TYMPANOSTOMY TUBE PLACEMENT Bilateral      Review of patient's allergies indicates no known allergies.  Family History   Problem Relation Age of Onset     Obesity Mother      Obesity Maternal Aunt      Obesity Paternal Aunt      Obesity Paternal Uncle      Hypertension Father      Hyperlipidemia Father      Asthma Daughter      Cancer Maternal Grandmother      Stroke Paternal Grandmother      Heart disease Paternal Grandmother      Social History     Social History     Marital status:      Spouse name: N/A     Number of children: N/A     Years of education: N/A     Occupational History     Not on file.     Social History Main Topics     Smoking status: Never Smoker     Smokeless tobacco: Never Used     Alcohol use No     Drug use: No     Sexual activity: Not Currently     Birth control/ protection: Surgical     Other Topics Concern     Not on file     Social History Narrative         Review of systems is as stated in HPI, and the remainder of the 10 system review is otherwise negative.    Objective:     Vitals:    09/07/17 1018   BP: 128/78   Patient Site: Left Arm   Patient Position: Sitting   Pulse: 69   Temp: 97.1  F (36.2  C)   TempSrc: Tympanic   SpO2: 97%   Weight: (!) 277 lb 1 oz (125.7 kg)   Height: 5' 3.25\" " (1.607 m)    Body mass index is 48.69 kg/(m^2).    General appearance: alert, appears stated age and cooperative  Head: Normocephalic, without obvious abnormality, atraumatic  Eyes: negative  Neck: no adenopathy and thyroid not enlarged, symmetric, no tenderness/mass/nodules  Lungs: clear to auscultation bilaterally  Heart: regular rate and rhythm, S1, S2 normal, no murmur, click, rub or gallop  Extremities: extremities normal, atraumatic, no cyanosis or edema  Neurologic: Grossly normal  Psych: affect flat, mood appropriate           This note has been dictated using voice recognition software. Any grammatical or context distortions are unintentional and inherent to the the software.

## 2021-10-19 NOTE — PATIENT INSTRUCTIONS
Support and accountability is an important part of your weight loss journey and maintenance once you reach your health goals.  Because of this, we require that you attend at least one of our support groups before you meet with the surgeon so you get a feel for what they are like and hopefully establish a connection to others who are going through a similar process or have had surgery before so you can ask your questions.    We currently have two options for support group but they are in the virtual format only at this time.  Both groups are using Microsoft Teams for their platform and you can access it through the web or an rhina that can be downloaded to a smart phone if you have one.      The Pre- and Post-op Connections Group is on the second Tuesday of the month from 6:30-8pm and is hosted by Cory Fermin, PhD.  If you are interested in this group, you will need to email him at [email or web address] each month and then he will in turn send you the invitation to join.      We also have a Post-op focused Connections Group the 4th Wednesday of the month from 11am-12pm that is mostly geared toward post-operative patients who are past three months post-op.  This group is hosted by HEMA Keith, CBN, CIC and you can email her to join the group at [email or web address] each month and she will send you an invite similar to Cory's group.  If you decide you would like to be a regular attender at this group, we can add you to an automatic invitation list of people.    You can see more information about available support groups that the [facility] offers to our patients as well by following the link:    [email or web address]      Please let us know if you have any questions.     Thank you,   [facility]

## 2021-10-29 ENCOUNTER — VIRTUAL VISIT (OUTPATIENT)
Dept: SURGERY | Facility: CLINIC | Age: 31
End: 2021-10-29
Payer: COMMERCIAL

## 2021-10-29 VITALS — BODY MASS INDEX: 36.14 KG/M2 | WEIGHT: 204 LBS | HEIGHT: 63 IN

## 2021-10-29 DIAGNOSIS — R63.2 HYPERPHAGIA: ICD-10-CM

## 2021-10-29 DIAGNOSIS — K91.2 POSTOPERATIVE MALABSORPTION: Primary | ICD-10-CM

## 2021-10-29 PROCEDURE — 99214 OFFICE O/P EST MOD 30 MIN: CPT | Mod: GT | Performed by: FAMILY MEDICINE

## 2021-10-29 RX ORDER — PHENTERMINE HYDROCHLORIDE 37.5 MG/1
18.75-37.5 TABLET ORAL
Qty: 90 TABLET | Refills: 0 | Status: SHIPPED | OUTPATIENT
Start: 2021-10-29 | End: 2022-01-05

## 2021-10-29 RX ORDER — CHOLECALCIFEROL (VITAMIN D3) 125 MCG
5000 CAPSULE ORAL DAILY
Qty: 90 CAPSULE | Refills: 3 | Status: SHIPPED | OUTPATIENT
Start: 2021-10-29 | End: 2022-10-29

## 2021-10-29 RX ORDER — MAGNESIUM 200 MG
1000 TABLET ORAL DAILY
Qty: 90 TABLET | Refills: 3 | Status: SHIPPED | OUTPATIENT
Start: 2021-10-29 | End: 2022-10-29

## 2021-10-29 RX ORDER — SERTRALINE HYDROCHLORIDE 100 MG/1
1 TABLET, FILM COATED ORAL DAILY
COMMUNITY
Start: 2021-10-01

## 2021-10-29 ASSESSMENT — MIFFLIN-ST. JEOR: SCORE: 1614.47

## 2021-10-29 NOTE — PROGRESS NOTES
Stefanie Cordova is 31 year old  female who presents for a billable video visit today.    How would you like to obtain your AVS? MyChart  If dropped from the video visit, the video invitation should be resent by: Text to cell phone: [phone]  Will anyone else be joining your video visit? No      Video Start Time: 9:30      Bariatric Follow Up Visit with a History of Previous Bariatric Surgery     Date of visit: 10/29/2021  Physician: Lilo Altman MD, MD  Primary Care Provider:  No Ref-Primary, Physician  Stefanie Cordova   31 year old  female    Date of Surgery: 12/11/2018  Initial Weight: 279#  Initial BMI: 46.43  Today's Weight:   Wt Readings from Last 1 Encounters:   10/29/21 92.5 kg (204 lb)     Body mass index is 36.14 kg/m .      Assessment and Plan     Assessment: Stefanie is a 31 year old year old female who is 3 yrs s/p  Sleeve Gastrectomy with Dr. Mccollum  Stefanie Cordova feels as if she had achieved the goals she hoped to accomplish through bariatric surgery and weight loss. She     Encounter Diagnoses   Name Primary?     Postoperative malabsorption Yes     Hyperphagia          Current Outpatient Medications:      childrens multivitamin with iron (FLINTSTONES COMPLETE) 10 MG CHEW, Take 1 tablet by mouth 2 times daily, Disp: 180 tablet, Rfl: 3     Cholecalciferol (VITAMIN D) 125 MCG (5000 UT) capsule, Take 1 capsule (5,000 Units) by mouth daily, Disp: 90 capsule, Rfl: 3     cyanocobalamin (VITAMIN B-12) 1000 MCG SUBL sublingual tablet, Place 1 tablet (1,000 mcg) under the tongue daily, Disp: 90 tablet, Rfl: 3     phentermine (ADIPEX-P) 37.5 MG tablet, Take 0.5-1 tablets (18.75-37.5 mg) by mouth every morning (before breakfast), Disp: 90 tablet, Rfl: 0     sertraline (ZOLOFT) 100 MG tablet, Take 1 tablet by mouth daily, Disp: , Rfl:     Plan: Annual labs. Restart vitamins RXs for that. Restart phentermine. Letter of RX in chart. External labs at [facility]. Dieitian f/u    Return in about 1 week  (around 2021).    Bariatric Surgery Review     Interim History/LifeChanges: Has had very high stress. CPS involvement. Kids are back with her Kids 1,3 and 5th grade. Sober. Was 167# at the beginning of the summer. Severe heartburn and constipation. Taking prune lax. Constipation preceded sleeve. Was eating poorly. Just started eating better.     Patient Concerns: weight gain  Appetite (1-10): increased  GERD: yes-really bad    Medication changes: not taking vitamins    Vitamin Intake:   B-12   no   MVI  no   Vitamin D  no   Calcium   no     Other  no              LABS: ordered    Nausea no  Vomiting no  Constipation yes  Diarrhea no  Rashes no  Hair Loss no  Calf tenderness no  Breathing difficulty no  Reactive Hypoglycemia yes  Light Headedness no   Moods euthymic and stable. Therapy 2X/wk    12 point ROS as above and otherwise negative      Habits:  Alcohol: no  Tobacco: on and off with high stress  Caffeine yes  NSAIDS no  Exercise Routine: joined the gym 2 weeks ago and going with her sister 3X/wk  3 meals/day yes  Protein first yes  50-60grams/day  Water Separate from meals yes  Calorie Containing Beverages occ 7 up, root beer,   Restaurant eating/wk none  Sleeping well  Stress moderate and coming down-manageable  CPAP: NA  Contraception: TL  DEXA:not indication    Social History     Social History     Socioeconomic History     Marital status:      Spouse name: Not on file     Number of children: Not on file     Years of education: Not on file     Highest education level: Not on file   Occupational History     Not on file   Tobacco Use     Smoking status: Current Some Day Smoker     Packs/day: 0.50     Last attempt to quit: 2017     Years since quittin.4     Smokeless tobacco: Never Used     Tobacco comment: Started smoking again in the last few months   Substance and Sexual Activity     Alcohol use: Yes     Alcohol/week: 0.0 - 2.0 standard drinks     Comment: rarely     Drug use: No      Frequency: 7.0 times per week     Types: Amphetamines     Comment: last use two days ago     Sexual activity: Yes     Partners: Male     Birth control/protection: Female Surgical, Surgical   Other Topics Concern     Not on file   Social History Narrative     Not on file     Social Determinants of Health     Financial Resource Strain:      Difficulty of Paying Living Expenses:    Food Insecurity:      Worried About Running Out of Food in the Last Year:      Ran Out of Food in the Last Year:    Transportation Needs:      Lack of Transportation (Medical):      Lack of Transportation (Non-Medical):    Physical Activity:      Days of Exercise per Week:      Minutes of Exercise per Session:    Stress:      Feeling of Stress :    Social Connections:      Frequency of Communication with Friends and Family:      Frequency of Social Gatherings with Friends and Family:      Attends [employer] Services:      Active Member of Clubs or Organizations:      Attends Club or Organization Meetings:      Marital Status:    Intimate Partner Violence:      Fear of Current or Ex-Partner:      Emotionally Abused:      Physically Abused:      Sexually Abused:        Past Medical History     Past Medical History:   Diagnosis Date     Alcoholism (H)     had some treatment court ordered around age 16.     Depression     in her teens hospitalized around 17yo, wrist cutting.      Disease of thyroid gland     temporary meds during her pregnancy, now resolved as of her last check.     Obesity      Urinary incontinence     mild stress incontinence     Problem List     Patient Active Problem List   Diagnosis     Depression     Adjustment disorder with depressed mood     ASCUS of cervix with negative high risk HPV     Borderline personality disorder (H)     History of abnormal cervical Pap smear     Intermittent explosive disorder     Normal delivery at term     Thyroid dysfunction     Medications       Current Outpatient Medications:      childrens  "multivitamin with iron (FLINTSTONES COMPLETE) 10 MG CHEW, Take 1 tablet by mouth 2 times daily, Disp: 180 tablet, Rfl: 3     Cholecalciferol (VITAMIN D) 125 MCG (5000 UT) capsule, Take 1 capsule (5,000 Units) by mouth daily, Disp: 90 capsule, Rfl: 3     cyanocobalamin (VITAMIN B-12) 1000 MCG SUBL sublingual tablet, Place 1 tablet (1,000 mcg) under the tongue daily, Disp: 90 tablet, Rfl: 3     phentermine (ADIPEX-P) 37.5 MG tablet, Take 0.5-1 tablets (18.75-37.5 mg) by mouth every morning (before breakfast), Disp: 90 tablet, Rfl: 0     sertraline (ZOLOFT) 100 MG tablet, Take 1 tablet by mouth daily, Disp: , Rfl:    Surgical History     Past Surgical History  She has a past surgical history that includes GYN surgery (03/19/2015); GI surgery (12/11/2018); tubal ligation; Tympanostomy Tube Placement (Bilateral); and Pr Lap, Raphael Restrict Proc, Longitudinal Gastrectomy (N/A, 12/11/2018).    Objective-Exam     Constitutional:  Ht 1.6 m (5' 3\")   Wt 92.5 kg (204 lb)   BMI 36.14 kg/m    General:  Pleasant and in no acute distress     Psychiatric: alert and oriented X3, mood and affect normal    Counseling     We reviewed the important post op bariatric recommendations:  -eating 3 meals daily  -eating protein first, getting >60gm protein daily  -eating slowly, chewing food well  -avoiding/limiting calorie containing beverages  -drinking water 15-30 minutes before or after meals  -choosing wheat, not white with breads, crackers, pastas, andrew, bagels, tortillas, rice  -limiting restaurant or cafeteria eating to twice a week or less    We discussed the importance of restorative sleep and stress management in maintaining a healthy weight.  We discussed the National Weight Control Registry healthy weight maintenance strategies and ways to optimize metabolism.  We discussed the importance of physical activity including cardiovascular and strength training in maintaining a healthier weight.    We discussed the importance of " life-long vitamin supplementation and life-long  follow-up.    Stefanie was reminded that, to avoid marginal ulcers she should avoid tobacco at all, alcohol in excess, caffeine in excess, and NSAIDS (unless indicated for cardioprotection or othewise and opposed by a PPI).    Lilo Altman MD, MD, [facility].  10/29/2021  9:38 AM  Total time spent on the date of this encounter doing: chart review, review of test results, patient visit, physical exam, education, counseling, developing plan of care, and documenting = 30 minutes.      Video-Visit Details    Type of service:  Video Visit    Video End Time (time video stopped): 10:00  Originating Location (pt. Location): Home    Distant Location (provider location):  [facility] SUR[facility] CL[facility] AND BARIATRICS CARE [place]     Platform used for Video Visit: [employer]

## 2021-10-29 NOTE — NURSING NOTE
Here for almost 3 yrs s/p LSG follow-up visit and to discuss weight gain. Pt says she was down to 167 lb at the beginning of the summer. She is wondering about a revision but has not followed up and hasn't taken her vitamins for a couple months. Pt says she has a lot of heartburn and constipation. Will do labs at [facility] so will need the orders mailed to her. Pt is possibly interested in medications for weight loss but isn't sure d/t having regular drug testing done.    Rani John RN, CBN  [facility]  P [phone]  F [phone]

## 2021-10-29 NOTE — LETTER
10/29/2021         RE: Stefanie Cordova  [address]  [place] [address]        Dear Colleague,    Thank you for referring your patient, Stefanie Cordova, to the [facility] SUR[facility]ERY CL[facility] AND BARIATRICS CARE [place]. Please see a copy of my visit note below.    Stefanie Cordova is 31 year old  female who presents for a billable video visit today.    How would you like to obtain your AVS? MyChart  If dropped from the video visit, the video invitation should be resent by: Text to cell phone: [phone]  Will anyone else be joining your video visit? No      Video Start Time: 9:30      Bariatric Follow Up Visit with a History of Previous Bariatric Surgery     Date of visit: 10/29/2021  Physician: Lilo Altman MD, MD  Primary Care Provider:  No Ref-Primary, Physician  Stefanie Cordova   31 year old  female    Date of Surgery: 12/11/2018  Initial Weight: 279#  Initial BMI: 46.43  Today's Weight:   Wt Readings from Last 1 Encounters:   10/29/21 92.5 kg (204 lb)     Body mass index is 36.14 kg/m .      Assessment and Plan     Assessment: Stefanie is a 31 year old year old female who is 3 yrs s/p  Sleeve Gastrectomy with Dr. Mccollum  Stefanie Cordova feels as if she had achieved the goals she hoped to accomplish through bariatric surgery and weight loss. She     Encounter Diagnoses   Name Primary?     Postoperative malabsorption Yes     Hyperphagia          Current Outpatient Medications:      childrens multivitamin with iron (FLINTSTONES COMPLETE) 10 MG CHEW, Take 1 tablet by mouth 2 times daily, Disp: 180 tablet, Rfl: 3     Cholecalciferol (VITAMIN D) 125 MCG (5000 UT) capsule, Take 1 capsule (5,000 Units) by mouth daily, Disp: 90 capsule, Rfl: 3     cyanocobalamin (VITAMIN B-12) 1000 MCG SUBL sublingual tablet, Place 1 tablet (1,000 mcg) under the tongue daily, Disp: 90 tablet, Rfl: 3     phentermine (ADIPEX-P) 37.5 MG tablet, Take 0.5-1 tablets (18.75-37.5 mg) by mouth every morning  (before breakfast), Disp: 90 tablet, Rfl: 0     sertraline (ZOLOFT) 100 MG tablet, Take 1 tablet by mouth daily, Disp: , Rfl:     Plan: Annual labs. Restart vitamins RXs for that. Restart phentermine. Letter of RX in chart. External labs at [facility]. Dieitian f/u    Return in about 1 week (around 11/5/2021).    Bariatric Surgery Review     Interim History/LifeChanges: Has had very high stress. CPS involvement. Kids are back with her Kids 1,3 and 5th grade. Sober. Was 167# at the beginning of the summer. Severe heartburn and constipation. Taking prune lax. Constipation preceded sleeve. Was eating poorly. Just started eating better.     Patient Concerns: weight gain  Appetite (1-10): increased  GERD: yes-really bad    Medication changes: not taking vitamins    Vitamin Intake:   B-12   no   MVI  no   Vitamin D  no   Calcium   no     Other  no              LABS: ordered    Nausea no  Vomiting no  Constipation yes  Diarrhea no  Rashes no  Hair Loss no  Calf tenderness no  Breathing difficulty no  Reactive Hypoglycemia yes  Light Headedness no   Moods euthymic and stable. Therapy 2X/wk    12 point ROS as above and otherwise negative      Habits:  Alcohol: no  Tobacco: on and off with high stress  Caffeine yes  NSAIDS no  Exercise Routine: joined the gym 2 weeks ago and going with her sister 3X/wk  3 meals/day yes  Protein first yes  50-60grams/day  Water Separate from meals yes  Calorie Containing Beverages occ 7 up, root beer,   Restaurant eating/wk none  Sleeping well  Stress moderate and coming down-manageable  CPAP: NA  Contraception: TL  DEXA:not indication    Social History     Social History     Socioeconomic History     Marital status:      Spouse name: Not on file     Number of children: Not on file     Years of education: Not on file     Highest education level: Not on file   Occupational History     Not on file   Tobacco Use     Smoking status: Current Some Day Smoker     Packs/day: 0.50     Last attempt  to quit: 2017     Years since quittin.4     Smokeless tobacco: Never Used     Tobacco comment: Started smoking again in the last few months   Substance and Sexual Activity     Alcohol use: Yes     Alcohol/week: 0.0 - 2.0 standard drinks     Comment: rarely     Drug use: No     Frequency: 7.0 times per week     Types: Amphetamines     Comment: last use two days ago     Sexual activity: Yes     Partners: Male     Birth control/protection: Female Surgical, Surgical   Other Topics Concern     Not on file   Social History Narrative     Not on file     Social Determinants of Health     Financial Resource Strain:      Difficulty of Paying Living Expenses:    Food Insecurity:      Worried About Running Out of Food in the Last Year:      Ran Out of Food in the Last Year:    Transportation Needs:      Lack of Transportation (Medical):      Lack of Transportation (Non-Medical):    Physical Activity:      Days of Exercise per Week:      Minutes of Exercise per Session:    Stress:      Feeling of Stress :    Social Connections:      Frequency of Communication with Friends and Family:      Frequency of Social Gatherings with Friends and Family:      Attends [employer] Services:      Active Member of Clubs or Organizations:      Attends Club or Organization Meetings:      Marital Status:    Intimate Partner Violence:      Fear of Current or Ex-Partner:      Emotionally Abused:      Physically Abused:      Sexually Abused:        Past Medical History     Past Medical History:   Diagnosis Date     Alcoholism (H)     had some treatment court ordered around age 16.     Depression     in her teens hospitalized around 17yo, wrist cutting.      Disease of thyroid gland     temporary meds during her pregnancy, now resolved as of her last check.     Obesity      Urinary incontinence     mild stress incontinence     Problem List     Patient Active Problem List   Diagnosis     Depression     Adjustment disorder with depressed mood      "ASCUS of cervix with negative high risk HPV     Borderline personality disorder (H)     History of abnormal cervical Pap smear     Intermittent explosive disorder     Normal delivery at term     Thyroid dysfunction     Medications       Current Outpatient Medications:      childrens multivitamin with iron (FLINTSTONES COMPLETE) 10 MG CHEW, Take 1 tablet by mouth 2 times daily, Disp: 180 tablet, Rfl: 3     Cholecalciferol (VITAMIN D) 125 MCG (5000 UT) capsule, Take 1 capsule (5,000 Units) by mouth daily, Disp: 90 capsule, Rfl: 3     cyanocobalamin (VITAMIN B-12) 1000 MCG SUBL sublingual tablet, Place 1 tablet (1,000 mcg) under the tongue daily, Disp: 90 tablet, Rfl: 3     phentermine (ADIPEX-P) 37.5 MG tablet, Take 0.5-1 tablets (18.75-37.5 mg) by mouth every morning (before breakfast), Disp: 90 tablet, Rfl: 0     sertraline (ZOLOFT) 100 MG tablet, Take 1 tablet by mouth daily, Disp: , Rfl:    Surgical History     Past Surgical History  She has a past surgical history that includes GYN surgery (03/19/2015); GI surgery (12/11/2018); tubal ligation; Tympanostomy Tube Placement (Bilateral); and Pr Lap, Raphael Restrict Proc, Longitudinal Gastrectomy (N/A, 12/11/2018).    Objective-Exam     Constitutional:  Ht 1.6 m (5' 3\")   Wt 92.5 kg (204 lb)   BMI 36.14 kg/m    General:  Pleasant and in no acute distress     Psychiatric: alert and oriented X3, mood and affect normal    Counseling     We reviewed the important post op bariatric recommendations:  -eating 3 meals daily  -eating protein first, getting >60gm protein daily  -eating slowly, chewing food well  -avoiding/limiting calorie containing beverages  -drinking water 15-30 minutes before or after meals  -choosing wheat, not white with breads, crackers, pastas, andrew, bagels, tortillas, rice  -limiting restaurant or cafeteria eating to twice a week or less    We discussed the importance of restorative sleep and stress management in maintaining a healthy weight.  We " discussed the National Weight Control Registry healthy weight maintenance strategies and ways to optimize metabolism.  We discussed the importance of physical activity including cardiovascular and strength training in maintaining a healthier weight.    We discussed the importance of life-long vitamin supplementation and life-long  follow-up.    Stefanie was reminded that, to avoid marginal ulcers she should avoid tobacco at all, alcohol in excess, caffeine in excess, and NSAIDS (unless indicated for cardioprotection or othewise and opposed by a PPI).    Lilo Altman MD, MD, [facility].  10/29/2021  9:38 AM  Total time spent on the date of this encounter doing: chart review, review of test results, patient visit, physical exam, education, counseling, developing plan of care, and documenting = 30 minutes.      Video-Visit Details    Type of service:  Video Visit    Video End Time (time video stopped): 10:00  Originating Location (pt. Location): Home    Distant Location (provider location):  [facility] CL[facility]IC [facility]     Platform used for Video Visit: [employer]      Again, thank you for allowing me to participate in the care of your patient.        Sincerely,        Lilo Altman MD

## 2021-10-30 ENCOUNTER — HEALTH MAINTENANCE LETTER (OUTPATIENT)
Age: 31
End: 2021-10-30

## 2021-11-02 ENCOUNTER — VIRTUAL VISIT (OUTPATIENT)
Dept: SURGERY | Facility: CLINIC | Age: 31
End: 2021-11-02
Payer: COMMERCIAL

## 2021-11-02 VITALS — WEIGHT: 204 LBS | BODY MASS INDEX: 36.14 KG/M2 | HEIGHT: 63 IN

## 2021-11-02 DIAGNOSIS — Z71.3 NUTRITIONAL COUNSELING: ICD-10-CM

## 2021-11-02 DIAGNOSIS — E66.812 OBESITY, CLASS II, BMI 35-39.9, ISOLATED (SEE ACTUAL BMI): ICD-10-CM

## 2021-11-02 DIAGNOSIS — Z98.84 BARIATRIC SURGERY STATUS: Primary | ICD-10-CM

## 2021-11-02 PROCEDURE — 97803 MED NUTRITION INDIV SUBSEQ: CPT | Mod: TEL | Performed by: DIETITIAN, REGISTERED

## 2021-11-02 ASSESSMENT — MIFFLIN-ST. JEOR: SCORE: 1614.47

## 2021-11-02 NOTE — LETTER
11/2/2021         RE: Stefanie Cordova  [address]  [place] [facility] [address]        Dear Colleague,    Thank you for referring your patient, Stefanie Cordova, to the [facility] SURGERY CLINIC AND BARIATRICS CARE [place]. Please see a copy of my visit note below.    Stefanie Cordova is a 31 year old who is being evaluated via a billable telephone visit.      What phone number would you like to be contacted at? [phone]  How would you like to obtain your AVS? Lydiahart      Post-op Surgical Weight Loss Diet Evaluation     Assessment:  Pt presents for 3 years post-op RD visit, s/p LSG on 12-11-18 with Dr. Mccollum. Today we reviewed current eating habits and level of physical activity, and instructed on the changes that are required for successful bariatric outcomes.    Patient Progress: lowest weight was 167lb post op- felt good here  +states she has had a lot of added stress- going through some things with her kids the past few months  Started phentermine on Saturday    Initial weight: 279 lb  Current weight: 204lb  Weight change: down 75lb    Body mass index is 36.14 kg/m .    Patient Active Problem List   Diagnosis     Depression     Adjustment disorder with depressed mood     ASCUS of cervix with negative high risk HPV     Borderline personality disorder (H)     History of abnormal cervical Pap smear     Intermittent explosive disorder     Normal delivery at term     Thyroid dysfunction     Vitamins   Multi Vit with Iron: yes  Calcium Citrate: yes  B12: yes  D3: yes    Do you experience any reflux or discomfort with eating? no  Nausea: no  Vomiting: rare  Diarrhea: no  Constipation: yes- feels like this is an on-going thing  Hair loss:no    Diet Recall/Time:   Breakfast: oatmeal and blueberries  Protein shake    Typical Snacks: cheese and meat roll up, veggies straws, granola bar, beef and cheddar sticks    Proteins/Veg/Fruits/CHO (NOT well tolerated): noodles    Meal Duration:unsure- will  "sometimes take a break    Fluid-meal separation:  Fluids are  30min before and 30 minutes after meals.    Fluid Intake  Water: aiming for about 50oz  Caffeine: coffee occasionally    Exercise: started going back to the gym- trying 3 days per week, workout video at home  +cleaning- ADLS      PES statement:      (NC-1.4) Altered GI Function related to Alteration in gastrointestinal tract structure and/or function/ Decreased functional length of the GI tract as evidenced by Weight loss of 26.88% initial body weight; sleeve gastrectomy  (NB-1.7) Undesirable food choices related to Failure to adjust for lifestyle changes as evidenced by low protein intake at meals; large portions; skipping meals; frequent grazing;  mindless eating ; drinking with meals, not chewing each bite to applesauce consistency; consuming caffeine/carbonation daily, frequent restaurant intake; and no structured activity regimen  (NI-5.7.1) Inadequate protein intake related to Gastric bypass causing increased nutrient needs due to malabsorption/ Decreased ability to consume sufficient protein as evidenced by Edema, poor musculature, dull skin, thin and fragile hair; and Estimated intake of protein insufficient to meet requirements    Intervention    Discussion  1. Discussed 18 months and beyond Post-Op Nutritional Guidelines for LSG  2. Recommended to consume 15-20gm protein at 3 meals daily, along with protein supplement/\"planned protein containing snack\" of 15-30gm protein, to reach goal of 60-80 gm protein daily.  3. Reviewed lean protein sources  4. Bariatric Plate Method-  including lean/low fat protein at each meal, including a vegetable/fruit, and limiting carbohydrate intake to less than 25% of plate volume.     Goals:  1. Get back to bariatric diet basics- eat three meals per day, protein at each meal  2. Take vitamins daily    Handouts provided:  18 months and beyond Post-Op Nutritional Guidelines for LSG  Snack ideas  Bariatric " Recipe Ideas    Assessment/Plan:    Pt to follow up in 1 month with bariatrician and 2 months with RD      Phone call duration: 20 minutes    BRIAN GALO RD          Again, thank you for allowing me to participate in the care of your patient.        Sincerely,        BRIAN GALO RD

## 2021-11-02 NOTE — PROGRESS NOTES
Stefanie Cordova is a 31 year old who is being evaluated via a billable telephone visit.      What phone number would you like to be contacted at? [phone]  How would you like to obtain your AVS? Giulia      Post-op Surgical Weight Loss Diet Evaluation     Assessment:  Pt presents for 3 years post-op RD visit, s/p LSG on 12-11-18 with Dr. Mccollum. Today we reviewed current eating habits and level of physical activity, and instructed on the changes that are required for successful bariatric outcomes.    Patient Progress: lowest weight was 167lb post op- felt good here  +states she has had a lot of added stress- going through some things with her kids the past few months  Started phentermine on Saturday    Initial weight: 279 lb  Current weight: 204lb  Weight change: down 75lb    Body mass index is 36.14 kg/m .    Patient Active Problem List   Diagnosis     Depression     Adjustment disorder with depressed mood     ASCUS of cervix with negative high risk HPV     Borderline personality disorder (H)     History of abnormal cervical Pap smear     Intermittent explosive disorder     Normal delivery at term     Thyroid dysfunction     Vitamins   Multi Vit with Iron: yes  Calcium Citrate: yes  B12: yes  D3: yes    Do you experience any reflux or discomfort with eating? no  Nausea: no  Vomiting: rare  Diarrhea: no  Constipation: yes- feels like this is an on-going thing  Hair loss:no    Diet Recall/Time:   Breakfast: oatmeal and blueberries  Protein shake    Typical Snacks: cheese and meat roll up, veggies straws, granola bar, beef and cheddar sticks    Proteins/Veg/Fruits/CHO (NOT well tolerated): noodles    Meal Duration:unsure- will sometimes take a break    Fluid-meal separation:  Fluids are  30min before and 30 minutes after meals.    Fluid Intake  Water: aiming for about 50oz  Caffeine: coffee occasionally    Exercise: started going back to the gym- trying 3 days per week, workout video at home  +cleaning-  "ADLS      PES statement:      (NC-1.4) Altered GI Function related to Alteration in gastrointestinal tract structure and/or function/ Decreased functional length of the GI tract as evidenced by Weight loss of 26.88% initial body weight; sleeve gastrectomy  (NB-1.7) Undesirable food choices related to Failure to adjust for lifestyle changes as evidenced by low protein intake at meals; large portions; skipping meals; frequent grazing;  mindless eating ; drinking with meals, not chewing each bite to applesauce consistency; consuming caffeine/carbonation daily, frequent restaurant intake; and no structured activity regimen  (NI-5.7.1) Inadequate protein intake related to Gastric bypass causing increased nutrient needs due to malabsorption/ Decreased ability to consume sufficient protein as evidenced by Edema, poor musculature, dull skin, thin and fragile hair; and Estimated intake of protein insufficient to meet requirements    Intervention    Discussion  1. Discussed 18 months and beyond Post-Op Nutritional Guidelines for LSG  2. Recommended to consume 15-20gm protein at 3 meals daily, along with protein supplement/\"planned protein containing snack\" of 15-30gm protein, to reach goal of 60-80 gm protein daily.  3. Reviewed lean protein sources  4. Bariatric Plate Method-  including lean/low fat protein at each meal, including a vegetable/fruit, and limiting carbohydrate intake to less than 25% of plate volume.     Goals:  1. Get back to bariatric diet basics- eat three meals per day, protein at each meal  2. Take vitamins daily    Handouts provided:  18 months and beyond Post-Op Nutritional Guidelines for LSG  Snack ideas  Bariatric Recipe Ideas    Assessment/Plan:    Pt to follow up in 1 month with bariatrician and 2 months with RD      Phone call duration: 20 minutes    BRIAN GALO RD      "

## 2021-11-29 ENCOUNTER — TELEPHONE (OUTPATIENT)
Dept: SURGERY | Facility: CLINIC | Age: 31
End: 2021-11-29
Payer: COMMERCIAL

## 2021-11-29 NOTE — TELEPHONE ENCOUNTER
Pt needs a call from Dr. Mccollum or Dr. Altman to discuss new hernia and her options. In a lot of pain, and the ER wasn't helpful with info. 12/2018 [facility].    [phone]  **OK to leave detailed VM

## 2021-11-29 NOTE — TELEPHONE ENCOUNTER
"Called pt to discuss. She was in the ER at [facility] yesterday. She has complaints of pain on her right side towards the front that hurts worse when she stands, bends and walks. She says that the pain started on Friday.  She says that the pain doesn't change with eating or not eating. She was given pain medications but they didn't do anything except for make her \"feel funny\" and was also sent home with maalox and antacids which are not helping.  No complaints of fevers or nausea/vomiting. I let her know that I will have the doctors take a look at the records from her ER visit and let her know what they say. Pt verbalized understanding.    Rani John RN, CBN  [facility]  P [phone]  F [phone]    "

## 2021-12-01 DIAGNOSIS — N39.0 URINARY TRACT INFECTION WITHOUT HEMATURIA, SITE UNSPECIFIED: Primary | ICD-10-CM

## 2021-12-01 DIAGNOSIS — N39.0 URINARY TRACT INFECTION WITHOUT HEMATURIA, SITE UNSPECIFIED: ICD-10-CM

## 2021-12-01 RX ORDER — NITROFURANTOIN 25; 75 MG/1; MG/1
100 CAPSULE ORAL 2 TIMES DAILY
Qty: 10 CAPSULE | Refills: 0 | Status: SHIPPED | OUTPATIENT
Start: 2021-12-01 | End: 2022-01-05

## 2021-12-01 RX ORDER — NITROFURANTOIN 25; 75 MG/1; MG/1
100 CAPSULE ORAL 2 TIMES DAILY
Qty: 10 CAPSULE | Refills: 0 | Status: SHIPPED | OUTPATIENT
Start: 2021-12-01 | End: 2021-12-01

## 2022-01-04 ENCOUNTER — VIRTUAL VISIT (OUTPATIENT)
Dept: SURGERY | Facility: CLINIC | Age: 32
End: 2022-01-04
Payer: COMMERCIAL

## 2022-01-04 VITALS — WEIGHT: 208 LBS | BODY MASS INDEX: 36.86 KG/M2 | HEIGHT: 63 IN

## 2022-01-04 DIAGNOSIS — Z98.84 BARIATRIC SURGERY STATUS: Primary | ICD-10-CM

## 2022-01-04 DIAGNOSIS — Z71.3 NUTRITIONAL COUNSELING: ICD-10-CM

## 2022-01-04 DIAGNOSIS — E66.812 OBESITY, CLASS II, BMI 35-39.9, ISOLATED (SEE ACTUAL BMI): ICD-10-CM

## 2022-01-04 PROCEDURE — 97803 MED NUTRITION INDIV SUBSEQ: CPT | Mod: TEL | Performed by: DIETITIAN, REGISTERED

## 2022-01-04 ASSESSMENT — MIFFLIN-ST. JEOR: SCORE: 1632.61

## 2022-01-04 NOTE — PROGRESS NOTES
Stefanie Cordova is a 31 year old who is being evaluated via a billable telephone visit.      What phone number would you like to be contacted at? [phone]  How would you like to obtain your AVS? [facility]      Medical  Weight Loss Follow-Up Diet Evaluation  Assessment:  Stefanie is presenting today for a follow up weight management nutrition consultation. Pt has had an initial appointment with Dr. Altman  Weight loss medication: Phentermine.   Pt's weight is 208 lbs 0 oz  Initial weight: 279lb  Weight change: down 71lb    BMI: Body mass index is 36.85 kg/m .  Ideal body weight: 52.4 kg (115 lb 8.3 oz)  Adjusted ideal body weight: 68.5 kg (150 lb 14.6 oz)    Recommended Protein Intake: 60-80 grams of protein/day  Patient Active Problem List:  Patient Active Problem List   Diagnosis     Depression     Adjustment disorder with depressed mood     ASCUS of cervix with negative high risk HPV     Borderline personality disorder (H)     History of abnormal cervical Pap smear     Intermittent explosive disorder     Normal delivery at term     Thyroid dysfunction     Progress on goals from last visit: frustrated with lack of weight loss  +states she is not eating three meals per day- did not eat anything besides a protein shake- eating salads, yogurt with fruit, peanut butter toast, cottage cheese with fruit  +recovering from COVID  +chicken noodle soup    +taking all bariatric vitamins  + fluids from meals, eating slowly  +3 year post op LSG on 12-11-18 with Dr. Mccollum    Beverages: getting in a lot of water  Exercise: trying to get out and go for walks    Nutrition Diagnosis:    (NC-1.4) Altered GI Function related to Alteration in gastrointestinal tract structure and/or function/ Decreased functional length of the GI tract as evidenced by Weight loss of 25.45% initial body weight; sleeve gastrectomy  (NB-1.7) Undesirable food choices related to Failure to adjust for lifestyle changes as evidenced by low protein  intake at meals; large portions; skipping meals; frequent grazing;  mindless eating ; drinking with meals, not chewing each bite to applesauce consistency; consuming caffeine/carbonation daily, frequent restaurant intake; and no structured activity regimen  (NI-5.7.1) Inadequate protein intake related to Gastric bypass causing increased nutrient needs due to malabsorption/ Decreased ability to consume sufficient protein as evidenced by Edema, poor musculature, dull skin, thin and fragile hair; and Estimated intake of protein insufficient to meet requirements       Intervention:  1. Food and/or nutrient delivery: patient continues to eat inadequately, sometimes getting only a protein shake in each day- experiencing low energy and not feeling well. Encouraged her to eat three meals per day and get protein at each meal to ensure adequate nutrition.  2. Nutrition counseling: motivational interviewing    Monitoring/Evaluation:    Goals:  1. Eat three meals per day    Patient to follow up tomorrow with bariatrician and 6 weeks with RD        Phone call duration: 15 minutes    BRIAN GALO RD

## 2022-01-04 NOTE — LETTER
1/4/2022         RE: Stefanie Cordova  [address]  [place] [address]        Dear Colleague,    Thank you for referring your patient, Stefanie Cordova, to the [facility] [place]. Please see a copy of my visit note below.    Stefanie Cordova is a 31 year old who is being evaluated via a billable telephone visit.      What phone number would you like to be contacted at? [phone]  How would you like to obtain your AVS? [facility]      Medical  Weight Loss Follow-Up Diet Evaluation  Assessment:  Stefanie is presenting today for a follow up weight management nutrition consultation. Pt has had an initial appointment with Dr. Altman  Weight loss medication: Phentermine.   Pt's weight is 208 lbs 0 oz  Initial weight: 279lb  Weight change: down 71lb    BMI: Body mass index is 36.85 kg/m .  Ideal body weight: 52.4 kg (115 lb 8.3 oz)  Adjusted ideal body weight: 68.5 kg (150 lb 14.6 oz)    Recommended Protein Intake: 60-80 grams of protein/day  Patient Active Problem List:  Patient Active Problem List   Diagnosis     Depression     Adjustment disorder with depressed mood     ASCUS of cervix with negative high risk HPV     Borderline personality disorder (H)     History of abnormal cervical Pap smear     Intermittent explosive disorder     Normal delivery at term     Thyroid dysfunction     Progress on goals from last visit: frustrated with lack of weight loss  +states she is not eating three meals per day- did not eat anything besides a protein shake- eating salads, yogurt with fruit, peanut butter toast, cottage cheese with fruit  +recovering from COVID  +chicken noodle soup    +taking all bariatric vitamins  + fluids from meals, eating slowly  +3 year post op LSG on 12-11-18 with Dr. Mccollum    Beverages: getting in a lot of water  Exercise: trying to get out and go for walks    Nutrition Diagnosis:    (NC-1.4) Altered GI Function related to Alteration in  gastrointestinal tract structure and/or function/ Decreased functional length of the GI tract as evidenced by Weight loss of 25.45% initial body weight; sleeve gastrectomy  (NB-1.7) Undesirable food choices related to Failure to adjust for lifestyle changes as evidenced by low protein intake at meals; large portions; skipping meals; frequent grazing;  mindless eating ; drinking with meals, not chewing each bite to applesauce consistency; consuming caffeine/carbonation daily, frequent restaurant intake; and no structured activity regimen  (NI-5.7.1) Inadequate protein intake related to Gastric bypass causing increased nutrient needs due to malabsorption/ Decreased ability to consume sufficient protein as evidenced by Edema, poor musculature, dull skin, thin and fragile hair; and Estimated intake of protein insufficient to meet requirements       Intervention:  1. Food and/or nutrient delivery: patient continues to eat inadequately, sometimes getting only a protein shake in each day- experiencing low energy and not feeling well. Encouraged her to eat three meals per day and get protein at each meal to ensure adequate nutrition.  2. Nutrition counseling: motivational interviewing    Monitoring/Evaluation:    Goals:  1. Eat three meals per day    Patient to follow up tomorrow with bariatrician and 6 weeks with RD        Phone call duration: 15 minutes    BRIAN GALO RD        Again, thank you for allowing me to participate in the care of your patient.        Sincerely,        BRIAN GALO RD

## 2022-01-05 ENCOUNTER — VIRTUAL VISIT (OUTPATIENT)
Dept: SURGERY | Facility: CLINIC | Age: 32
End: 2022-01-05
Payer: COMMERCIAL

## 2022-01-05 VITALS — WEIGHT: 208 LBS | HEIGHT: 63 IN | BODY MASS INDEX: 36.86 KG/M2

## 2022-01-05 DIAGNOSIS — R63.8 ABNORMAL CRAVING: ICD-10-CM

## 2022-01-05 DIAGNOSIS — R63.2 HYPERPHAGIA: ICD-10-CM

## 2022-01-05 DIAGNOSIS — K91.2 POSTOPERATIVE MALABSORPTION: Primary | ICD-10-CM

## 2022-01-05 PROCEDURE — 99213 OFFICE O/P EST LOW 20 MIN: CPT | Mod: GT | Performed by: FAMILY MEDICINE

## 2022-01-05 RX ORDER — NALTREXONE HYDROCHLORIDE 50 MG/1
TABLET, FILM COATED ORAL
Qty: 90 TABLET | Refills: 3 | Status: SHIPPED | OUTPATIENT
Start: 2022-01-05

## 2022-01-05 RX ORDER — FAMOTIDINE 20 MG/1
TABLET, FILM COATED ORAL
COMMUNITY
Start: 2021-11-29

## 2022-01-05 RX ORDER — BUSPIRONE HYDROCHLORIDE 5 MG/1
TABLET ORAL
COMMUNITY
Start: 2022-01-05

## 2022-01-05 RX ORDER — PHENTERMINE HYDROCHLORIDE 37.5 MG/1
18.75-37.5 TABLET ORAL
Qty: 90 TABLET | Refills: 1 | Status: SHIPPED | OUTPATIENT
Start: 2022-01-05 | End: 2022-07-26

## 2022-01-05 ASSESSMENT — MIFFLIN-ST. JEOR: SCORE: 1632.61

## 2022-01-05 NOTE — PROGRESS NOTES
Stefanie Cordova is 31 year old  female who presents for a billable video visit today.    How would you like to obtain your AVS? MyChart  If dropped from the video visit, the video invitation should be resent by: Text to cell phone: [phone]  Will anyone else be joining your video visit? No      Video Start Time: 1:07pm connection delay    Bariatric Follow Up Visit with a History of Previous Bariatric Surgery     Date of visit: 1/5/2022  Physician: Lilo Altman MD, MD  Primary Care Provider:  No Ref-Primary, Physician  Stefanie Cordova   31 year old  female    Date of Surgery: 12/11/2018  Initial Weight: 279  Initial BMI: 46.43  Today's Weight:   Wt Readings from Last 1 Encounters:   01/05/22 94.3 kg (208 lb)     Body mass index is 36.85 kg/m .      Assessment and Plan     Assessment: Stefanie is a 31 year old year old female who is 3 yrs s/p  Sleeve Gastrectomy with Dr. Mccollum  Stefanie Cordova feels as if she has achieved the goals she hoped to accomplish through bariatric surgery and weight loss.    Encounter Diagnoses   Name Primary?     Postoperative malabsorption Yes     Hyperphagia      Abnormal craving          Current Outpatient Medications:      busPIRone (BUSPAR) 5 MG tablet, , Disp: , Rfl:      childrens multivitamin with iron (FLINTSTONES COMPLETE) 10 MG CHEW, Take 1 tablet by mouth 2 times daily, Disp: 180 tablet, Rfl: 3     Cholecalciferol (VITAMIN D) 125 MCG (5000 UT) capsule, Take 1 capsule (5,000 Units) by mouth daily, Disp: 90 capsule, Rfl: 3     cyanocobalamin (VITAMIN B-12) 1000 MCG SUBL sublingual tablet, Place 1 tablet (1,000 mcg) under the tongue daily, Disp: 90 tablet, Rfl: 3     famotidine (PEPCID) 20 MG tablet, , Disp: , Rfl:      phentermine (ADIPEX-P) 37.5 MG tablet, Take 0.5-1 tablets (18.75-37.5 mg) by mouth every morning (before breakfast), Disp: 90 tablet, Rfl: 0     sertraline (ZOLOFT) 100 MG tablet, Take 1 tablet by mouth daily, Disp: , Rfl:     Plan: Add naltrexone  for cravings. Refill phentermine. Take breaks and discussed remedies for constipation. Using dulcolax prn currently. Start exercise video today. F/U with Tammy 1 mo.    Return in about 1 month (around 2022).    Bariatric Surgery Review     Interim History/LifeChanges: Initial weight 279. Lost down to 160 and now 208#. Maintaining a 80# weight loss    Patient Concerns: constipation, weight regain  Appetite (1-10): down  GERD: on PPI    Medication changes: no    Vitamin Intake:   B-12   SL   MVI  2//d   Vitamin D  5,000   Calcium        Other                LABS: ordered    Nausea sometimes  Vomiting no  Constipation yes  Diarrhea no  Rashes acne  Hair Loss no  Calf tenderness no  Breathing difficulty no  Reactive Hypoglycemia no  Light Headedness no   Moods off and on    12 point ROS as above and otherwise negative      Habits:  Alcohol: occ  Tobacco: occ 2//wk  Caffeine occ  NSAIDS no  Exercise Routine: has an exercise video will start today  3 meals/day no  Protein first no  grams/day  Water Separate from meals yes  Calorie Containing Beverages rare  Restaurant eating/wk rare  Sleeping 6-8  Stress moderate  CPAP:   Contraception: TL  DEXA:not indicated for age <45    Social History     Social History     Socioeconomic History     Marital status:      Spouse name: Not on file     Number of children: Not on file     Years of education: Not on file     Highest education level: Not on file   Occupational History     Not on file   Tobacco Use     Smoking status: Current Some Day Smoker     Packs/day: 0.50     Last attempt to quit: 2017     Years since quittin.5     Smokeless tobacco: Never Used     Tobacco comment: Started smoking again in the last few months   Substance and Sexual Activity     Alcohol use: Yes     Alcohol/week: 0.0 - 2.0 standard drinks     Comment: rarely     Drug use: No     Frequency: 7.0 times per week     Types: Amphetamines     Comment: last use two days ago     Sexual  activity: Yes     Partners: Male     Birth control/protection: Female Surgical, Surgical   Other Topics Concern     Not on file   Social History Narrative     Not on file     Social Determinants of Health     Financial Resource Strain: Not on file   Food Insecurity: Not on file   Transportation Needs: Not on file   Physical Activity: Not on file   Stress: Not on file   Social Connections: Not on file   Intimate Partner Violence: Not on file   Housing Stability: Not on file       Past Medical History     Past Medical History:   Diagnosis Date     Alcoholism (H)     had some treatment court ordered around age 16.     Depression     in her teens hospitalized around 17yo, wrist cutting.      Disease of thyroid gland     temporary meds during her pregnancy, now resolved as of her last check.     Obesity      Urinary incontinence     mild stress incontinence     Problem List     Patient Active Problem List   Diagnosis     Depression     Adjustment disorder with depressed mood     ASCUS of cervix with negative high risk HPV     Borderline personality disorder (H)     History of abnormal cervical Pap smear     Intermittent explosive disorder     Normal delivery at term     Thyroid dysfunction     Medications       Current Outpatient Medications:      busPIRone (BUSPAR) 5 MG tablet, , Disp: , Rfl:      childrens multivitamin with iron (FLINTSTONES COMPLETE) 10 MG CHEW, Take 1 tablet by mouth 2 times daily, Disp: 180 tablet, Rfl: 3     Cholecalciferol (VITAMIN D) 125 MCG (5000 UT) capsule, Take 1 capsule (5,000 Units) by mouth daily, Disp: 90 capsule, Rfl: 3     cyanocobalamin (VITAMIN B-12) 1000 MCG SUBL sublingual tablet, Place 1 tablet (1,000 mcg) under the tongue daily, Disp: 90 tablet, Rfl: 3     famotidine (PEPCID) 20 MG tablet, , Disp: , Rfl:      phentermine (ADIPEX-P) 37.5 MG tablet, Take 0.5-1 tablets (18.75-37.5 mg) by mouth every morning (before breakfast), Disp: 90 tablet, Rfl: 0     sertraline (ZOLOFT) 100 MG  "tablet, Take 1 tablet by mouth daily, Disp: , Rfl:    Surgical History     Past Surgical History  She has a past surgical history that includes GYN surgery (03/19/2015); GI surgery (12/11/2018); tubal ligation; Tympanostomy Tube Placement (Bilateral); and Pr Lap, Raphael Restrict Proc, Longitudinal Gastrectomy (N/A, 12/11/2018).    Objective-Exam     Constitutional:  Ht 1.6 m (5' 3\")   Wt 94.3 kg (208 lb)   BMI 36.85 kg/m    General:  Pleasant and in no acute distress     Psychiatric: alert and oriented X3, mood and affect normal    Counseling     We reviewed the important post op bariatric recommendations:  -eating 3 meals daily  -eating protein first, getting >60gm protein daily  -eating slowly, chewing food well  -avoiding/limiting calorie containing beverages  -drinking water 15-30 minutes before or after meals  -choosing wheat, not white with breads, crackers, pastas, andrew, bagels, tortillas, rice  -limiting restaurant or cafeteria eating to twice a week or less    We discussed the importance of restorative sleep and stress management in maintaining a healthy weight.  We discussed the National Weight Control Registry healthy weight maintenance strategies and ways to optimize metabolism.  We discussed the importance of physical activity including cardiovascular and strength training in maintaining a healthier weight.    We discussed the importance of life-long vitamin supplementation and life-long  follow-up.    Stefanie was reminded that, to avoid marginal ulcers she should avoid tobacco at all, alcohol in excess, caffeine in excess, and NSAIDS (unless indicated for cardioprotection or othewise and opposed by a PPI).    Lilo Altman MD, MD, FAAFP  [facility].  1/5/2022  1:11 PM  Total time spent on the date of this encounter doing: chart review, review of test results, patient visit, physical exam, education, counseling, developing plan of care, and documenting = 25 " minutes.      Video-Visit Details    Type of service:  Video Visit    Video End Time (time video stopped): 1:38 PM  Originating Location (pt. Location): Home    Distant Location (provider location):  [facility]     Platform used for Video Visit: [employer]

## 2022-01-05 NOTE — LETTER
1/5/2022         RE: Stefanie Cordova  [address]  [facility] [address]        Dear Colleague,    Thank you for referring your patient, Stefanie Cordova, to the [facility] SURGERY CLINIC AND BARIATRICS CARE [place]. Please see a copy of my visit note below.    Stefanie Cordova is 31 year old  female who presents for a billable video visit today.    How would you like to obtain your AVS? MyChart  If dropped from the video visit, the video invitation should be resent by: Text to cell phone: [phone]  Will anyone else be joining your video visit? No      Video Start Time: 1:07pm connection delay    Bariatric Follow Up Visit with a History of Previous Bariatric Surgery     Date of visit: 1/5/2022  Physician: Lilo Altman MD, MD  Primary Care Provider:  No Ref-Primary, Physician  Stefanie Cordova   31 year old  female    Date of Surgery: 12/11/2018  Initial Weight: 279  Initial BMI: 46.43  Today's Weight:   Wt Readings from Last 1 Encounters:   01/05/22 94.3 kg (208 lb)     Body mass index is 36.85 kg/m .      Assessment and Plan     Assessment: Stefanie is a 31 year old year old female who is 3 yrs s/p  Sleeve Gastrectomy with Dr. Mccollum  Stefanie Cordova feels as if she has achieved the goals she hoped to accomplish through bariatric surgery and weight loss.    Encounter Diagnoses   Name Primary?     Postoperative malabsorption Yes     Hyperphagia      Abnormal craving          Current Outpatient Medications:      busPIRone (BUSPAR) 5 MG tablet, , Disp: , Rfl:      childrens multivitamin with iron (FLINTSTONES COMPLETE) 10 MG CHEW, Take 1 tablet by mouth 2 times daily, Disp: 180 tablet, Rfl: 3     Cholecalciferol (VITAMIN D) 125 MCG (5000 UT) capsule, Take 1 capsule (5,000 Units) by mouth daily, Disp: 90 capsule, Rfl: 3     cyanocobalamin (VITAMIN B-12) 1000 MCG SUBL sublingual tablet, Place 1 tablet (1,000 mcg) under the tongue daily, Disp: 90 tablet, Rfl: 3     famotidine (PEPCID)  20 MG tablet, , Disp: , Rfl:      phentermine (ADIPEX-P) 37.5 MG tablet, Take 0.5-1 tablets (18.75-37.5 mg) by mouth every morning (before breakfast), Disp: 90 tablet, Rfl: 0     sertraline (ZOLOFT) 100 MG tablet, Take 1 tablet by mouth daily, Disp: , Rfl:     Plan: Add naltrexone for cravings. Refill phentermine. Take breaks and discussed remedies for constipation. Using dulcolax prn currently. Start exercise video today. F/U with Tammy 1 mo.    Return in about 1 month (around 2022).    Bariatric Surgery Review     Interim History/LifeChanges: Initial weight 279. Lost down to 160 and now 208#. Maintaining a 80# weight loss    Patient Concerns: constipation, weight regain  Appetite (1-10): down  GERD: on PPI    Medication changes: no    Vitamin Intake:   B-12   SL   MVI  2//d   Vitamin D  5,000   Calcium        Other                LABS: ordered    Nausea sometimes  Vomiting no  Constipation yes  Diarrhea no  Rashes acne  Hair Loss no  Calf tenderness no  Breathing difficulty no  Reactive Hypoglycemia no  Light Headedness no   Moods off and on    12 point ROS as above and otherwise negative      Habits:  Alcohol: occ  Tobacco: occ 2//wk  Caffeine occ  NSAIDS no  Exercise Routine: has an exercise video will start today  3 meals/day no  Protein first no  grams/day  Water Separate from meals yes  Calorie Containing Beverages rare  Restaurant eating/wk rare  Sleeping 6-8  Stress moderate  CPAP:   Contraception: TL  DEXA:not indicated for age <45    Social History     Social History     Socioeconomic History     Marital status:      Spouse name: Not on file     Number of children: Not on file     Years of education: Not on file     Highest education level: Not on file   Occupational History     Not on file   Tobacco Use     Smoking status: Current Some Day Smoker     Packs/day: 0.50     Last attempt to quit: 2017     Years since quittin.5     Smokeless tobacco: Never Used     Tobacco comment: Started  smoking again in the last few months   Substance and Sexual Activity     Alcohol use: Yes     Alcohol/week: 0.0 - 2.0 standard drinks     Comment: rarely     Drug use: No     Frequency: 7.0 times per week     Types: Amphetamines     Comment: last use two days ago     Sexual activity: Yes     Partners: Male     Birth control/protection: Female Surgical, Surgical   Other Topics Concern     Not on file   Social History Narrative     Not on file     Social Determinants of Health     Financial Resource Strain: Not on file   Food Insecurity: Not on file   Transportation Needs: Not on file   Physical Activity: Not on file   Stress: Not on file   Social Connections: Not on file   Intimate Partner Violence: Not on file   Housing Stability: Not on file       Past Medical History     Past Medical History:   Diagnosis Date     Alcoholism (H)     had some treatment court ordered around age 16.     Depression     in her teens hospitalized around 15yo, wrist cutting.      Disease of thyroid gland     temporary meds during her pregnancy, now resolved as of her last check.     Obesity      Urinary incontinence     mild stress incontinence     Problem List     Patient Active Problem List   Diagnosis     Depression     Adjustment disorder with depressed mood     ASCUS of cervix with negative high risk HPV     Borderline personality disorder (H)     History of abnormal cervical Pap smear     Intermittent explosive disorder     Normal delivery at term     Thyroid dysfunction     Medications       Current Outpatient Medications:      busPIRone (BUSPAR) 5 MG tablet, , Disp: , Rfl:      childrens multivitamin with iron (FLINTSTONES COMPLETE) 10 MG CHEW, Take 1 tablet by mouth 2 times daily, Disp: 180 tablet, Rfl: 3     Cholecalciferol (VITAMIN D) 125 MCG (5000 UT) capsule, Take 1 capsule (5,000 Units) by mouth daily, Disp: 90 capsule, Rfl: 3     cyanocobalamin (VITAMIN B-12) 1000 MCG SUBL sublingual tablet, Place 1 tablet (1,000 mcg) under  "the tongue daily, Disp: 90 tablet, Rfl: 3     famotidine (PEPCID) 20 MG tablet, , Disp: , Rfl:      phentermine (ADIPEX-P) 37.5 MG tablet, Take 0.5-1 tablets (18.75-37.5 mg) by mouth every morning (before breakfast), Disp: 90 tablet, Rfl: 0     sertraline (ZOLOFT) 100 MG tablet, Take 1 tablet by mouth daily, Disp: , Rfl:    Surgical History     Past Surgical History  She has a past surgical history that includes GYN surgery (03/19/2015); GI surgery (12/11/2018); tubal ligation; Tympanostomy Tube Placement (Bilateral); and Pr Lap, Raphael Restrict Proc, Longitudinal Gastrectomy (N/A, 12/11/2018).    Objective-Exam     Constitutional:  Ht 1.6 m (5' 3\")   Wt 94.3 kg (208 lb)   BMI 36.85 kg/m    General:  Pleasant and in no acute distress     Psychiatric: alert and oriented X3, mood and affect normal    Counseling     We reviewed the important post op bariatric recommendations:  -eating 3 meals daily  -eating protein first, getting >60gm protein daily  -eating slowly, chewing food well  -avoiding/limiting calorie containing beverages  -drinking water 15-30 minutes before or after meals  -choosing wheat, not white with breads, crackers, pastas, andrew, bagels, tortillas, rice  -limiting restaurant or cafeteria eating to twice a week or less    We discussed the importance of restorative sleep and stress management in maintaining a healthy weight.  We discussed the National Weight Control Registry healthy weight maintenance strategies and ways to optimize metabolism.  We discussed the importance of physical activity including cardiovascular and strength training in maintaining a healthier weight.    We discussed the importance of life-long vitamin supplementation and life-long  follow-up.    Stefanie was reminded that, to avoid marginal ulcers she should avoid tobacco at all, alcohol in excess, caffeine in excess, and NSAIDS (unless indicated for cardioprotection or othewise and opposed by a PPI).    Liol Alcantara Sund, " MD YVES, [facility].  1/5/2022  1:11 PM  Total time spent on the date of this encounter doing: chart review, review of test results, patient visit, physical exam, education, counseling, developing plan of care, and documenting = 25 minutes.      Video-Visit Details    Type of service:  Video Visit    Video End Time (time video stopped): 1:38 PM  Originating Location (pt. Location): Home    Distant Location (provider location):  [facility] SURGERY CLINIC AND BARIATRICS [facility]     Platform used for Video Visit: [employer]      Again, thank you for allowing me to participate in the care of your patient.        Sincerely,        Lilo Altman MD

## 2022-02-21 NOTE — PROGRESS NOTES
Bariatric Care Clinic Non Surgical Follow up Visit   Date of visit: 2/21/2022  Physician: KELLEE Espinal MD, MD  Primary Care is No Ref-Primary, Physician.  Stefanie Cordova   31 year old  female     Initial Weight: 279# prior to SG on 12/11/18 with Dr. Mccollum , weight lolis approximately 160#  Initial BMI: 46.43  Today's Weight:   Wt Readings from Last 1 Encounters:   02/22/22 95.3 kg (210 lb)     Body mass index is 37.2 kg/m .           Assessment and Plan   Assessment: Stefanie is a 31 year old year old female who presents for medical weight management.      Plan:    1. Obesity, Class II, BMI 35-39.9, isolated (see actual BMI)  Patient was congratulated on her success thus far. Healthy habits to assist with further weight loss were discussed. She will continue the phentermine and naltrexone.    2. Postoperative malabsorption  Patient is taking all vitamins as directed.  Routine postoperative labs were not drawn. She was reminded to slow down, chew foods thoroughly, and to avoid liquids within 30 minutes of solids. She would like a revision. She states that her surgery covers it although it is not clear to me that they will cover it for weight regain.    Follow up 60 minute in person visit with myself or Dr. Altman for initial surgical consultation for revision           INTERIM HISTORY  Patient saw Dr. Altman last month for routine postoperative visit. She was struggling with cravings so naltrexone was added to her phentermine. She feels that it helps sometimes. She feels that the naltrexone is helping to reduce cravings. She is now thinking she wants a revision of her bariatric surgery. She complains of heartburn that is not relieved with pepcid.     DIETARY HISTORY  Meals Per Day: 3  Eating Protein First?: yes per patient  Food Diary: Meal size: aprox 1 cup:protein shake or smoothie with fruit, cottage cheese, rare vegetables  Snacks Per Day: none  Fluid Intake: 64 oz  Portion Control: yes per patient  Calorie  "Containing Beverages: none  Meals at Restaurant per week:0-1    Positive Changes Since Last Visit: portion control  Struggling With: frustrated with inability to lose weight    Knowledgeable in Reading Food Labels: not sure  Getting Adequate Protein: yes  Sleeping 7-8 hours/day yes    PHYSICAL ACTIVITY PATTERNS:  Walking or exercise video 30-60 minutes 3-4 days per week    REVIEW OF SYSTEMS  GENERAL/CONSTITUTIONAL:  Fatigue: yes  HEENT:  Vision changes, glaucoma: no  NEUROLOGIC:  Paresthesias: no  PSYCHIATRIC:  Moods: frustrated  MUSCULOSKELETAL/RHEUMATOLOGIC  Arthralgias: knee pain  Myalgias: no  :  Birth control: not discussed       Patient Profile   Social History     Social History Narrative     Not on file        Past Medical History   Past Medical History:   Diagnosis Date     Alcoholism (H)     had some treatment court ordered around age 16.     Depression     in her teens hospitalized around 17yo, wrist cutting.      Disease of thyroid gland     temporary meds during her pregnancy, now resolved as of her last check.     Obesity      Urinary incontinence     mild stress incontinence     Patient Active Problem List   Diagnosis     Depression     Adjustment disorder with depressed mood     ASCUS of cervix with negative high risk HPV     Borderline personality disorder (H)     History of abnormal cervical Pap smear     Intermittent explosive disorder     Normal delivery at term     Thyroid dysfunction       Past Surgical History  She has a past surgical history that includes GYN surgery (03/19/2015); GI surgery (12/11/2018); tubal ligation; Tympanostomy Tube Placement (Bilateral); and Pr Lap, Raphael Restrict Proc, Longitudinal Gastrectomy (N/A, 12/11/2018).     Examination   Ht 1.6 m (5' 3\")   Wt 95.3 kg (210 lb)   BMI 37.20 kg/m    GENERAL: Healthy, alert and no distress  EYES: Eyes grossly normal to inspection.  No discharge or erythema, or obvious scleral/conjunctival abnormalities.  RESP: No audible " wheeze, cough, or visible cyanosis.  No visible retractions or increased work of breathing.    SKIN: Visible skin clear. No significant rash, abnormal pigmentation or lesions.  NEURO: Cranial nerves grossly intact.  Mentation and speech appropriate for age.  PSYCH: Mentation appears normal, affect normal/bright, judgement and insight intact, normal speech and appearance well-groomed.       Counseling:   We reviewed the important post op bariatric recommendations:  -eating 3 meals daily  -eating protein first, getting >60gm protein daily  -eating slowly, chewing food well  -avoiding/limiting calorie containing beverages  -limiting starchy vegetables and carbohydrates, choosing wheat, not white with breads,   crackers, pastas, andrew, bagels, tortillas, rice  -limiting restaurant or cafeteria eating to twice a week or less    We discussed the importance of restorative sleep and stress management in maintaining a healthy weight.  We discussed the National Weight Control Registry healthy weight maintenance strategies and ways to optimize metabolism.  We discussed the importance of physical activity including cardiovascular and strength training in maintaining a healthier weight.    Total time spent on the date of this encounter doing: chart review, review of test results, patient visit, physical exam, education, counseling, developing plan of care and documenting = 41 minutes.         KELLEE Espinal MD  [facility]    Stefanie Cordova is 31 year old  female who presents for a billable video visit today.    How would you like to obtain your AVS? MyChart  If dropped from the video visit, the video invitation should be resent by: Send to e-mail at: [email or web address]  Will anyone else be joining your video visit? No      Video Start Time: 2:45 pm    Are there any specific questions or needs that you would like addressed at your visit today? Discuss new weight loss medication or revision. S/p LSG in  2018 with Dr. Mccollum, Weight Gain, constant heartburn.        Video-Visit Details    Type of service:  Video Visit    Video End Time (time video stopped): 3:07 PM  Originating Location (pt. Location): Home    Distant Location (provider location):  [facility] SURGERY CLINIC AND BARIATRICS CARE [place]     Platform used for Video Visit: [employer]

## 2022-02-22 ENCOUNTER — VIRTUAL VISIT (OUTPATIENT)
Dept: SURGERY | Facility: CLINIC | Age: 32
End: 2022-02-22
Payer: COMMERCIAL

## 2022-02-22 VITALS — HEIGHT: 63 IN | BODY MASS INDEX: 37.21 KG/M2 | WEIGHT: 210 LBS

## 2022-02-22 DIAGNOSIS — K91.2 POSTOPERATIVE MALABSORPTION: ICD-10-CM

## 2022-02-22 DIAGNOSIS — E66.812 OBESITY, CLASS II, BMI 35-39.9, ISOLATED (SEE ACTUAL BMI): Primary | ICD-10-CM

## 2022-02-22 PROCEDURE — 99214 OFFICE O/P EST MOD 30 MIN: CPT | Mod: GT | Performed by: FAMILY MEDICINE

## 2022-02-22 NOTE — LETTER
2/22/2022         RE: Stefanie Cordova  [address]  [facility] [address]        Dear Colleague,    Thank you for referring your patient, Stefanie Cordova, to the [facility] SURGERY CLINIC AND BARIATRICS CARE [place]. Please see a copy of my visit note below.    Bariatric Care Clinic Non Surgical Follow up Visit   Date of visit: 2/21/2022  Physician: KELLEE Espinal MD, MD  Primary Care is No Ref-Primary, Physician.  Stefanie Cordova   31 year old  female     Initial Weight: 279# prior to SG on 12/11/18 with Dr. Mccollum , weight lolis approximately 160#  Initial BMI: 46.43  Today's Weight:   Wt Readings from Last 1 Encounters:   02/22/22 95.3 kg (210 lb)     Body mass index is 37.2 kg/m .           Assessment and Plan   Assessment: Stefanie is a 31 year old year old female who presents for medical weight management.      Plan:    1. Obesity, Class II, BMI 35-39.9, isolated (see actual BMI)  Patient was congratulated on her success thus far. Healthy habits to assist with further weight loss were discussed. She will continue the phentermine and naltrexone.    2. Postoperative malabsorption  Patient is taking all vitamins as directed.  Routine postoperative labs were not drawn. She was reminded to slow down, chew foods thoroughly, and to avoid liquids within 30 minutes of solids. She would like a revision. She states that her surgery covers it although it is not clear to me that they will cover it for weight regain.    Follow up 60 minute in person visit with myself or Dr. Altman for initial surgical consultation for revision           INTERIM HISTORY  Patient saw Dr. Altman last month for routine postoperative visit. She was struggling with cravings so naltrexone was added to her phentermine. She feels that it helps sometimes. She feels that the naltrexone is helping to reduce cravings. She is now thinking she wants a revision of her bariatric surgery. She complains of heartburn that is not relieved with  pepcid.     DIETARY HISTORY  Meals Per Day: 3  Eating Protein First?: yes per patient  Food Diary: Meal size: aprox 1 cup:protein shake or smoothie with fruit, cottage cheese, rare vegetables  Snacks Per Day: none  Fluid Intake: 64 oz  Portion Control: yes per patient  Calorie Containing Beverages: none  Meals at Restaurant per week:0-1    Positive Changes Since Last Visit: portion control  Struggling With: frustrated with inability to lose weight    Knowledgeable in Reading Food Labels: not sure  Getting Adequate Protein: yes  Sleeping 7-8 hours/day yes    PHYSICAL ACTIVITY PATTERNS:  Walking or exercise video 30-60 minutes 3-4 days per week    REVIEW OF SYSTEMS  GENERAL/CONSTITUTIONAL:  Fatigue: yes  HEENT:  Vision changes, glaucoma: no  NEUROLOGIC:  Paresthesias: no  PSYCHIATRIC:  Moods: frustrated  MUSCULOSKELETAL/RHEUMATOLOGIC  Arthralgias: knee pain  Myalgias: no  :  Birth control: not discussed       Patient Profile   Social History     Social History Narrative     Not on file        Past Medical History   Past Medical History:   Diagnosis Date     Alcoholism (H)     had some treatment court ordered around age 16.     Depression     in her teens hospitalized around 15yo, wrist cutting.      Disease of thyroid gland     temporary meds during her pregnancy, now resolved as of her last check.     Obesity      Urinary incontinence     mild stress incontinence     Patient Active Problem List   Diagnosis     Depression     Adjustment disorder with depressed mood     ASCUS of cervix with negative high risk HPV     Borderline personality disorder (H)     History of abnormal cervical Pap smear     Intermittent explosive disorder     Normal delivery at term     Thyroid dysfunction       Past Surgical History  She has a past surgical history that includes GYN surgery (03/19/2015); GI surgery (12/11/2018); tubal ligation; Tympanostomy Tube Placement (Bilateral); and Pr Lap, Raphael Restrict Proc, Longitudinal Gastrectomy  "(N/A, 12/11/2018).     Examination   Ht 1.6 m (5' 3\")   Wt 95.3 kg (210 lb)   BMI 37.20 kg/m    GENERAL: Healthy, alert and no distress  EYES: Eyes grossly normal to inspection.  No discharge or erythema, or obvious scleral/conjunctival abnormalities.  RESP: No audible wheeze, cough, or visible cyanosis.  No visible retractions or increased work of breathing.    SKIN: Visible skin clear. No significant rash, abnormal pigmentation or lesions.  NEURO: Cranial nerves grossly intact.  Mentation and speech appropriate for age.  PSYCH: Mentation appears normal, affect normal/bright, judgement and insight intact, normal speech and appearance well-groomed.       Counseling:   We reviewed the important post op bariatric recommendations:  -eating 3 meals daily  -eating protein first, getting >60gm protein daily  -eating slowly, chewing food well  -avoiding/limiting calorie containing beverages  -limiting starchy vegetables and carbohydrates, choosing wheat, not white with breads,   crackers, pastas, andrew, bagels, tortillas, rice  -limiting restaurant or cafeteria eating to twice a week or less    We discussed the importance of restorative sleep and stress management in maintaining a healthy weight.  We discussed the National Weight Control Registry healthy weight maintenance strategies and ways to optimize metabolism.  We discussed the importance of physical activity including cardiovascular and strength training in maintaining a healthier weight.    Total time spent on the date of this encounter doing: chart review, review of test results, patient visit, physical exam, education, counseling, developing plan of care and documenting = 41 minutes.         KELLEE Espinal MD  [facility]    Stefanie Cordova is 31 year old  female who presents for a billable video visit today.    How would you like to obtain your AVS? MyChart  If dropped from the video visit, the video invitation should be resent by: " Send to e-mail at: [email or web address]  Will anyone else be joining your video visit? No      Video Start Time: 2:45 pm    Are there any specific questions or needs that you would like addressed at your visit today? Discuss new weight loss medication or revision. S/p LSG in 2018 with Dr. Mccollum, Weight Gain, constant heartburn.        Video-Visit Details    Type of service:  Video Visit    Video End Time (time video stopped): 3:07 PM  Originating Location (pt. Location): Home    Distant Location (provider location):  [facility] CLINIC [facility]ATR[facility]     Platform used for Video Visit: [employer]           Again, thank you for allowing me to participate in the care of your patient.        Sincerely,        KELLEE Espinal MD

## 2022-02-22 NOTE — PATIENT INSTRUCTIONS
[facility]  Nutritional Guidelines  Gastric Sleeve 18 Months Post Op and Beyond    General Guidelines and Helpful Hints:    Eat 3 meals per day + protein supplement(s). No snacks between meals.  o Do not skip meals.  This can cause overeating at the next meal and will prevent adequate protein and nutritional intake.    Aim for 60-80 grams of protein per day.  o Always eat your protein first. This assists with optimal nutrition and helps you stay full longer.  o Depending on your portion size, you may need to drink approved protein supplement between meals to achieve protein goals. Follow recommendations of your Dietitian.     Eat your protein first, and then follow with fiber.   o It is not necessary to count your fiber, but 15-20 grams per day is recommended.    o Add fiber by including fruits, vegetables, whole grains, and beans.     Portions should remain about 1 cup per meal. Use measuring cups to be accurate.    Continue to use saucer/salad plates, infant/toddler silverware to keep portion sizes small and take small bites.    Eat S-L-O-W-L-Y to make each meal last 20-30 minutes. Always stop eating when satisfied.    Continue to use caution with foods containing skins, peels or membranes. Chew well!    Aim for 64 oz. of calorie-free fluids daily.  o Continue to avoid caffeine and carbonation. If you choose to drink alcohol, do so in moderation.   o Remember to avoid drinking during meals, 15-30 minutes before and 30 minutes after.    Exercise is zafar for continued weight loss and weight maintenance. Aim for 30-60 minutes of physical activity most days of the week. Include cardiovascular and strength training.    If having trouble tolerating meat, try using a crock-pot, tinfoil tent, steamer or other moist cooking method to create tender meats. Add broth or low-fat gravy to help meat stay moist.     Avoid high sugar and high fat foods to prevent high calorie intakes and a reduced rate of weight loss, or  weight regain.  o Check nutrition labels for less than 10 grams of sugar and less than 10 grams of fat per serving.    Continue Taking Vitamins/Minerals:  o 6350-2782 mcg of Sublingual B-12 daily  o 1 Multivitamin with Iron twice daily (chewable or swallow tabs)  o 500-600 mg Calcium Citrate twice daily (chewable or swallow tabs)  o 5000 IU Vitamin D3 daily  o One iron tablet per day for menstruating females  o You may need an additional B complex or thiamine tablet    Sample Grocery List    Protein:    Fat free Greek or light yogurt (less than 10 grams sugar)    Fat free or low-fat cottage cheese    String cheese or reduced fat cheese slices    Tuna, salmon, crab, egg, or chicken salad made with light or fat free mayonnaise    Egg or Egg Substitute    Lean/extra lean turkey, beef, bison, venison (ground, sirloin, round, flank)    Pork loin or tenderloin (grilled, baked, broiled)    Fish such as salmon, tuna, trout, tilapia, etc. (grilled, baked, broiled)    Tender cuts of lean (skinless) turkey or chicken    Lean deli meats: turkey, lean ham, chicken, lean roast beef    Beans such as kidney, garbanzo, black, tinajero, or low-fat/fat free refried beans    Peanut butter (natural preferred). Limit to 1 Tbsp. per day.    Low-fat meatloaf (made with lean ground beef or turkey)    Sloppy Joes made with low-sugar ketchup and lean ground beef or turkey    Soy or vegetable protein (i.e. vegan crumbles, soy/veggie burger, tofu)    Hummus    Vegetables:    Fresh: cooked or raw (as tolerated)    Frozen vegetables    Canned vegetables (low sodium or no salt added, rinse before cooking/eating)    (Ok to have skins/peels/membranes/seeds - just chew well)    Fruits:    Fresh fruit    Frozen fruit (no sugar added)    Canned fruit (packed in its own juice, NOT syrup)    (Ok to have skins/peels/membranes/seeds - just chew well)    Starch:    Unsweetened whole-grain hot cereal (or high fiber cold cereal, dry)    Toasted whole wheat bread  or [place] Thins    Whole grain crackers    Baked /boiled/mashed potato/sweet potato    Cooked whole grain pasta, brown rice, or other cooked whole grains    Starchy vegetables: corn, peas, winter squash    Protein Supplement:     Ready to drink protein shake with:  o 15-30 grams protein per serving  o Less than 10 grams total carbohydrate per serving     Protein powder mixed with:  o  Skim or 1% milk  o Low fat or fat free Lactaid milk, plain or no sugar added soymilk  o Water     Fats: (use in moderation)    1 teaspoon of soft tub margarine    1 teaspoon olive oil, canola oil, or peanut oil    1 tablespoon of low-fat fontanez or salad dressing     Sample Menu for 18+ months after Gastric Sleeve    You do NOT need to eat/drink the full portion sizes listed below  Always stop when you are satisfied    Breakfast   cup 1% cottage cheese     cup mixed berries   Lunch 2 oz lean roast beef on   [place] Thin with 1 tsp. light fontanez    small tomato, chopped, mixed with 1 tsp. light vinaigrette dressing   Supplement Approved protein supplement (if needed between meals)   Dinner 2 oz grilled salmon    cup salad greens with 1 tsp. light salad dressing and 1 tsp. ground flax seed    cup quinoa or brown rice     Breakfast   cup egg substitute with   cup sautéed chopped vegetables  2 light [place] Krisp crackers   Lunch Tuna Melt:   cup tuna mixed with 1 tsp. light fontanez over   [place] Thin. Top with 2-3 slices cucumber and 1 oz slice of low fat cheese   Supplement 1 cup skim milk (if needed between meals)   Dinner 3 oz  grilled, broiled, or baked seasoned skinless chicken breast    cup asparagus     Breakfast   cup plain oatmeal made with skim or 1% milk with 1 Tbsp. flavored/unflavored protein powder added  1 mozzarella string cheese   Lunch 2 oz deli turkey breast  1/3 cup salad with 1 tsp. light salad dressing, 1/8 of a whole avocado and 1 Tbsp. sunflower seeds   Dinner 3 oz. pork loin made in a crock pot, seasoned with a spice  rub    cup cooked carrots   Supplement Approved protein supplement (if needed between meals)     Breakfast 1 cup breakfast casserole made with egg substitute, turkey sausage,  and steamed, chopped bell peppers   Supplement  1 cup light Greek yogurt (if needed between meals)   Lunch 2 oz. teriyaki turkey    cup mashed sweet potato with 1-2 spritzes of spray butter (like Parkay)    cup fresh pineapple   Dinner 3 oz low fat meatloaf    cup roasted garlic zucchini     Breakfast   cup leftover breakfast casserole    cup no sugar added applesauce with 1 Tbsp. unflavored protein powder and a sprinkle of cinnamon    Lunch 3 oz shrimp with 1-2 Tbsp. low-sugar cocktail sauce for dipping    c. whole wheat pasta drizzled with   tsp. olive oil   Supplement 1 cup skim/1% milk with scoop of protein powder (if needed between meals)   Dinner Grilled, seasoned kebob with 2 oz lean beef and   cup vegetables     Breakfast Breakfast pizza:   [place] Thin spread with 1 Tbsp. low sugar spaghetti sauce,   cup shredded low fat cheese, melted and 1 slice of [place] blankenship     cup fresh fruit mixed with chopped almonds   Lunch   cup black bean soup  4-5 whole grain crackers   Dinner 3 oz  tilapia with lemon pepper seasoning    cup stewed tomatoes   Supplement 1 string cheese (if needed between meals)     Breakfast 2 hard boiled eggs (discard 1 egg yolk)    whole wheat English Muffin with 1 tsp. low sugar jelly   Lunch   cup leftover black bean soup topped with 1-2 Tbsp. low fat cheese  2-3 light R[place] Krisp crackers   Supplement Approved protein supplement (if needed between meals)   Dinner 3 oz sirloin steak    cup steamed broccoli

## 2022-02-23 ENCOUNTER — VIRTUAL VISIT (OUTPATIENT)
Dept: SURGERY | Facility: CLINIC | Age: 32
End: 2022-02-23
Payer: COMMERCIAL

## 2022-02-23 VITALS — WEIGHT: 210 LBS | BODY MASS INDEX: 37.21 KG/M2 | HEIGHT: 63 IN

## 2022-02-23 DIAGNOSIS — E66.812 OBESITY, CLASS II, BMI 35-39.9, ISOLATED (SEE ACTUAL BMI): ICD-10-CM

## 2022-02-23 DIAGNOSIS — Z98.84 BARIATRIC SURGERY STATUS: Primary | ICD-10-CM

## 2022-02-23 PROCEDURE — 97803 MED NUTRITION INDIV SUBSEQ: CPT | Mod: TEL | Performed by: DIETITIAN, REGISTERED

## 2022-02-23 NOTE — PROGRESS NOTES
Stefanie Crodova is a 31 year old who is being evaluated via a billable telephone visit.      What phone number would you like to be contacted at? [phone]  How would you like to obtain your AVS? Giulia      Post-op Surgical Weight Loss Diet Evaluation     Assessment:  Pt presents for 3 years  post-op RD visit, s/p LSG on 12-11-18 with Dr. Mccollum. Today we reviewed current eating habits and level of physical activity, and instructed on the changes that are required for successful bariatric outcomes.    Patient Progress: is interested in revision- states she has improved on eating- eating three meals per day  +feeling ok, taking phentermine and naltrexone    Initial weight: 279lb  Current weight: 210lb  Weight change: down 69lb    Body mass index is 37.2 kg/m .    Patient Active Problem List   Diagnosis     Depression     Adjustment disorder with depressed mood     ASCUS of cervix with negative high risk HPV     Borderline personality disorder (H)     History of abnormal cervical Pap smear     Intermittent explosive disorder     Normal delivery at term     Thyroid dysfunction       Vitamins   Multi Vit with Iron: yes  Calcium Citrate: yes  B12: yes  D3: yes    Do you experience hunger? rare  Do you experience any reflux or discomfort with eating? Yes- taking pepcid  Nausea: no  Vomiting: no  Diarrhea: no  Constipation: yes- no change in this  Hair loss:no    Diet Recall/Time:   Breakfast: protein shake with milk and fruit  Lunch: protein and veggie OR cottage cheese, yogurt  Dinner: microwave meal once in a while      Proteins/Veg/Fruits/CHO (NOT well tolerated): starchy foods    Estimated protein intake: 60-80 grams    Estimated portion size per meals:3/4-1 cup/meal- gets full quickly    Meal Duration:15-20 min    Fluid-meal separation:  Fluids are  30min before and 30 minutes after meals.    Fluid Intake  Water: states she only drinks water    Exercise: started working out with kids at home- [employer],  "walking on nicer days  +states she has knee pain    PES statement:      (NC-1.4) Altered GI Function related to Alteration in gastrointestinal tract structure and/or function/ Decreased functional length of the GI tract as evidenced by Weight loss of 24.73% initial body weight;  sleeve gastrectomy    Intervention    Discussion  1. Recommended to consume 15-20gm protein at 3 meals daily, along with protein supplement/\"planned protein containing snack\" of 15-30gm protein, to reach goal of 60-80 gm protein daily.  2. Reviewed lean protein sources  3. Bariatric Plate Method-  including lean/low fat protein at each meal, including a vegetable/fruit, and limiting carbohydrate intake to less than 25% of plate volume.     Goals:   1. Increase exercise   2. Consistency with diet-protein at each meal    Assessment/Plan:    Pt to follow up in 1 month with RD and 2 months with bariatrician       Phone call duration: 15 minutes      BRIAN GALO RD      "

## 2022-02-23 NOTE — LETTER
2/23/2022         RE: Stefanie Cordova  [address]  [facility] [address]        Dear Colleague,    Thank you for referring your patient, Stefanie Cordova, to the [facility] AND [facility]RICS [facility]ARE [place]. Please see a copy of my visit note below.    Stefanie Cordova is a 31 year old who is being evaluated via a billable telephone visit.      What phone number would you like to be contacted at? [phone]  How would you like to obtain your AVS? Lydiahart      Post-op Surgical Weight Loss Diet Evaluation     Assessment:  Pt presents for 3 years  post-op RD visit, s/p LSG on 12-11-18 with Dr. Mccollum. Today we reviewed current eating habits and level of physical activity, and instructed on the changes that are required for successful bariatric outcomes.    Patient Progress: is interested in revision- states she has improved on eating- eating three meals per day  +feeling ok, taking phentermine and naltrexone    Initial weight: 279lb  Current weight: 210lb  Weight change: down 69lb    Body mass index is 37.2 kg/m .    Patient Active Problem List   Diagnosis     Depression     Adjustment disorder with depressed mood     ASCUS of cervix with negative high risk HPV     Borderline personality disorder (H)     History of abnormal cervical Pap smear     Intermittent explosive disorder     Normal delivery at term     Thyroid dysfunction       Vitamins   Multi Vit with Iron: yes  Calcium Citrate: yes  B12: yes  D3: yes    Do you experience hunger? rare  Do you experience any reflux or discomfort with eating? Yes- taking pepcid  Nausea: no  Vomiting: no  Diarrhea: no  Constipation: yes- no change in this  Hair loss:no    Diet Recall/Time:   Breakfast: protein shake with milk and fruit  Lunch: protein and veggie OR cottage cheese, yogurt  Dinner: microwave meal once in a while      Proteins/Veg/Fruits/CHO (NOT well tolerated): starchy foods    Estimated protein intake: 60-80 grams    Estimated  "portion size per meals:3/4-1 cup/meal- gets full quickly    Meal Duration:15-20 min    Fluid-meal separation:  Fluids are  30min before and 30 minutes after meals.    Fluid Intake  Water: states she only drinks water    Exercise: started working out with kids at home- youtube, walking on nicer days  +states she has knee pain    PES statement:      (NC-1.4) Altered GI Function related to Alteration in gastrointestinal tract structure and/or function/ Decreased functional length of the GI tract as evidenced by Weight loss of 24.73% initial body weight;  sleeve gastrectomy    Intervention    Discussion  1. Recommended to consume 15-20gm protein at 3 meals daily, along with protein supplement/\"planned protein containing snack\" of 15-30gm protein, to reach goal of 60-80 gm protein daily.  2. Reviewed lean protein sources  3. Bariatric Plate Method-  including lean/low fat protein at each meal, including a vegetable/fruit, and limiting carbohydrate intake to less than 25% of plate volume.     Goals:   1. Increase exercise   2. Consistency with diet-protein at each meal    Assessment/Plan:    Pt to follow up in 1 month with RD and 2 months with bariatrician       Phone call duration: 15 minutes      BRIAN GALO RD          Again, thank you for allowing me to participate in the care of your patient.        Sincerely,        BRIAN GALO RD    "

## 2022-03-23 ENCOUNTER — TELEPHONE (OUTPATIENT)
Dept: SURGERY | Facility: CLINIC | Age: 32
End: 2022-03-23
Payer: COMMERCIAL

## 2022-03-28 ENCOUNTER — TELEPHONE (OUTPATIENT)
Dept: SURGERY | Facility: CLINIC | Age: 32
End: 2022-03-28
Payer: COMMERCIAL

## 2022-03-28 NOTE — TELEPHONE ENCOUNTER
Sent video link via text and attempted to call for video visit at 11:00a. Unable to leave a message at this time- answering service said caller is unavailable at this time.

## 2022-03-29 ENCOUNTER — DOCUMENTATION ONLY (OUTPATIENT)
Dept: SURGERY | Facility: CLINIC | Age: 32
End: 2022-03-29
Payer: COMMERCIAL

## 2022-03-29 NOTE — PROGRESS NOTES
I spoke with Stefanie about wanting to have a revision and the steps she needs to take to pursue this.  Her insurance is [employer] and I have submitted a referral for her to participate in the phone program again.  She will need one year of SWL.  She actually started back with us in October for weight loss and those will be put toward her year goal.  She understands that she's actually doing the program all over again

## 2022-03-30 ENCOUNTER — VIRTUAL VISIT (OUTPATIENT)
Dept: SURGERY | Facility: CLINIC | Age: 32
End: 2022-03-30
Payer: COMMERCIAL

## 2022-03-30 VITALS — BODY MASS INDEX: 36.32 KG/M2 | WEIGHT: 205 LBS | HEIGHT: 63 IN

## 2022-03-30 DIAGNOSIS — E66.812 OBESITY, CLASS II, BMI 35-39.9, ISOLATED (SEE ACTUAL BMI): ICD-10-CM

## 2022-03-30 DIAGNOSIS — R63.2 HYPERPHAGIA: ICD-10-CM

## 2022-03-30 DIAGNOSIS — Z98.84 BARIATRIC SURGERY STATUS: Primary | ICD-10-CM

## 2022-03-30 PROCEDURE — 97803 MED NUTRITION INDIV SUBSEQ: CPT | Mod: TEL | Performed by: DIETITIAN, REGISTERED

## 2022-03-30 NOTE — PROGRESS NOTES
Stefanie Cordova is a 32 year old who is being evaluated via a billable video visit.      How would you like to obtain your AVS? MyChart  If the video visit is dropped, the invitation should be resent by: Send to e-mail at: [email or web address]  Will anyone else be joining your video visit? No      Video Start Time: 2:51 PM      Follow Up Surgical Weight Loss Supervised Diet Evaluation    Assessment:  Pt. is being seen today for a follow up RD nutritional evaluation. Pt. has been unsuccessful with non-surgical weight loss methods and is interested in bariatric surgery. Today we reviewed current eating habits and level of physical activity, and instructed on the changes that are required for successful bariatric outcomes.    Surgery of interest per pt: Revision.  +lowest weight was 167lb    Workflow review:  Support Group: Not completed.  Psychology:Not completed.  Lab work:Completed.  SWL:Yes 1 year- started in October    Weight goal: At or below initial.    Anthropometrics:  Pt's weight is 205 lbs 0 oz  Initial weight: 279lb  Weight change: down 74lb- down 5lb from previous visit last month  BMI: Body mass index is 36.31 kg/m .   Ideal body weight: 52.4 kg (115 lb 8.3 oz)  Adjusted ideal body weight: 69.5 kg (153 lb 5 oz)  Estimated RMR (Racine-St Jeor equation):  1616 kcals x 1.2 (sedentary) = 1939 kcals (for weight maintenance)      Medical History:  Patient Active Problem List   Diagnosis     Depression     Adjustment disorder with depressed mood     ASCUS of cervix with negative high risk HPV     Borderline personality disorder (H)     History of abnormal cervical Pap smear     Intermittent explosive disorder     Normal delivery at term     Thyroid dysfunction      HbA1c:  No results found for: HGBA1C    Progress over past month: stress has improved in the past month  +taking phentermine and naltrexone  +focusing on protein, fruits and veggies    Diet Recall/Time  Breakfast: protein shake- protein powder with  fruits and veggies  Lunch: yogurt, cottage cheese and fruit  Dinner: sloppy heron meat- baked cream of mushroom chicken with rice and veggie      Meal duration: 20 minutes.      fluids by 30 minutes before, during meal, and waiting 30 minutes after meal before drinking fluids: Yes    Beverages  All she drinks is water- might have a glass of milk or juice    Exercise  At home recently- youtube, body weight exercises    PES statement:   (NI-1.3) Excessive energy intake related to Food and nutrition related knowledge deficit concerning excessive energy/oral intake as evidenced by Intake of high caloric density foods/beverages (juice, soda, alcohol) at meals and/or snacks; large portions; frequent grazing; estimated intake that exceeds estimated daily energy intake; binge eating patterns; frequent fast food or restaurant intake; and BMI 36.31     Intervention    Nutrition Education:   1. Provided general overview of diet and lifestyle modifications needed to be a deemed a safe candidate for bariatric surgery.   2. Educated patient on how to read a food label: choosing foods with than 10 grams fat and 10 grams sugar per serving to avoid dumping syndrome.  3. Dumping Syndrome: Described the mechanisms of syndrome, symptoms, and prevention tools from a dietary perspective.   4. Vitamins: Educated on post-op vitamin regimen including MVI+ 18 mg Fe two times a day, calcium citrate 400-600 mg two times a day, 9727-5896 mcg sublingual B12 daily, 5000 IU vitamin D3 daily.     Food/Nutrient Delivery:  5. Educated patient on eating three meals, with cutting out snacking.  6. Bariatric Plate: Patient and I discussed the importance of including a lean protein source (20-30 grams/meal), vegetables (included at lunch and dinner), one serving (15g) of carbohydrate, and limited added fat (1 tb/day) at each meal.   7. Educated patient on how to complete a food journal and benefits of meal planning.   8. Educated patient on using  a protein powder drink as a meal replacement and/or supplement after bariatric surgery.   9. Discussed importance of adequate hydration after surgery, with goal of at least 64 oz of fluids/day.  10. Addressed avoiding all carbonated, caffeinated and sweetened drinks to prepare for bariatric surgery.     Nutrition Counselin. Mindful eating techniques: Encouraged slow meal pace, chewing foods to applesauce consistency for 20-30 minutes/meal.   12. Discussed  fluids 30 minutes before, during, and after meal to prevent dumping syndrome and discomfort post bariatric surgery.   13. Discussed pre/post operative diet progression, post op vitamin regimen, gave review of surgery process.     Instructions/Goals:     1. Continue implementing bariatric surgery lifestyle modifications.  2. Improve consistency with exercise  3. Maintain diet changes      Monitor/Evaluation:  Pt. s target weight:  no gain from initial visit, pt. verbalized understanding.     Plan for next visit:   Review bariatric plate and dumping syndrome      Phone call duration: 20 minutes- switched to phone visit due to connectivity issues    BRIAN GALO RD

## 2022-03-30 NOTE — LETTER
3/30/2022         RE: Stefanie Cordova  [address]  [facility] [address]        Dear Colleague,    Thank you for referring your patient, Stefanie Cordova, to the [facility] [place]. Please see a copy of my visit note below.    Stefanie Cordova is a 32 year old who is being evaluated via a billable video visit.      How would you like to obtain your AVS? MyChart  If the video visit is dropped, the invitation should be resent by: Send to e-mail at: [email or web address]  Will anyone else be joining your video visit? No      Video Start Time: 2:51 PM      Follow Up Surgical Weight Loss Supervised Diet Evaluation    Assessment:  Pt. is being seen today for a follow up RD nutritional evaluation. Pt. has been unsuccessful with non-surgical weight loss methods and is interested in bariatric surgery. Today we reviewed current eating habits and level of physical activity, and instructed on the changes that are required for successful bariatric outcomes.    Surgery of interest per pt: Revision.  +lowest weight was 167lb    Workflow review:  Support Group: Not completed.  Psychology:Not completed.  Lab work:Completed.  SWL:Yes 1 year- started in October    Weight goal: At or below initial.    Anthropometrics:  Pt's weight is 205 lbs 0 oz  Initial weight: 279lb  Weight change: down 74lb- down 5lb from previous visit last month  BMI: Body mass index is 36.31 kg/m .   Ideal body weight: 52.4 kg (115 lb 8.3 oz)  Adjusted ideal body weight: 69.5 kg (153 lb 5 oz)  Estimated RMR (Ada-St Jeor equation):  1616 kcals x 1.2 (sedentary) = 1939 kcals (for weight maintenance)      Medical History:  Patient Active Problem List   Diagnosis     Depression     Adjustment disorder with depressed mood     ASCUS of cervix with negative high risk HPV     Borderline personality disorder (H)     History of abnormal cervical Pap smear     Intermittent explosive disorder     Normal delivery at  term     Thyroid dysfunction      HbA1c:  No results found for: HGBA1C    Progress over past month: stress has improved in the past month  +taking phentermine and naltrexone  +focusing on protein, fruits and veggies    Diet Recall/Time  Breakfast: protein shake- protein powder with fruits and veggies  Lunch: yogurt, cottage cheese and fruit  Dinner: sloppy heron meat- baked cream of mushroom chicken with rice and veggie      Meal duration: 20 minutes.      fluids by 30 minutes before, during meal, and waiting 30 minutes after meal before drinking fluids: Yes    Beverages  All she drinks is water- might have a glass of milk or juice    Exercise  At home recently- youtube, body weight exercises    PES statement:   (NI-1.3) Excessive energy intake related to Food and nutrition related knowledge deficit concerning excessive energy/oral intake as evidenced by Intake of high caloric density foods/beverages (juice, soda, alcohol) at meals and/or snacks; large portions; frequent grazing; estimated intake that exceeds estimated daily energy intake; binge eating patterns; frequent fast food or restaurant intake; and BMI 36.31     Intervention    Nutrition Education:   1. Provided general overview of diet and lifestyle modifications needed to be a deemed a safe candidate for bariatric surgery.   2. Educated patient on how to read a food label: choosing foods with than 10 grams fat and 10 grams sugar per serving to avoid dumping syndrome.  3. Dumping Syndrome: Described the mechanisms of syndrome, symptoms, and prevention tools from a dietary perspective.   4. Vitamins: Educated on post-op vitamin regimen including MVI+ 18 mg Fe two times a day, calcium citrate 400-600 mg two times a day, 5205-8116 mcg sublingual B12 daily, 5000 IU vitamin D3 daily.     Food/Nutrient Delivery:  5. Educated patient on eating three meals, with cutting out snacking.  6. Bariatric Plate: Patient and I discussed the importance of including a  lean protein source (20-30 grams/meal), vegetables (included at lunch and dinner), one serving (15g) of carbohydrate, and limited added fat (1 tb/day) at each meal.   7. Educated patient on how to complete a food journal and benefits of meal planning.   8. Educated patient on using a protein powder drink as a meal replacement and/or supplement after bariatric surgery.   9. Discussed importance of adequate hydration after surgery, with goal of at least 64 oz of fluids/day.  10. Addressed avoiding all carbonated, caffeinated and sweetened drinks to prepare for bariatric surgery.     Nutrition Counselin. Mindful eating techniques: Encouraged slow meal pace, chewing foods to applesauce consistency for 20-30 minutes/meal.   12. Discussed  fluids 30 minutes before, during, and after meal to prevent dumping syndrome and discomfort post bariatric surgery.   13. Discussed pre/post operative diet progression, post op vitamin regimen, gave review of surgery process.     Instructions/Goals:     1. Continue implementing bariatric surgery lifestyle modifications.  2. Improve consistency with exercise  3. Maintain diet changes      Monitor/Evaluation:  Pt. s target weight:  no gain from initial visit, pt. verbalized understanding.     Plan for next visit:   Review bariatric plate and dumping syndrome      Phone call duration: 20 minutes- switched to phone visit due to connectivity issues    BRIAN GALO RD          Again, thank you for allowing me to participate in the care of your patient.        Sincerely,        BRIAN GALO RD

## 2022-04-05 ENCOUNTER — VIRTUAL VISIT (OUTPATIENT)
Dept: SURGERY | Facility: CLINIC | Age: 32
End: 2022-04-05
Payer: COMMERCIAL

## 2022-04-05 DIAGNOSIS — E66.01 MORBID OBESITY (H): Primary | ICD-10-CM
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
## 2022-04-18 ENCOUNTER — VIRTUAL VISIT (OUTPATIENT)
Dept: SURGERY | Facility: CLINIC | Age: 32
End: 2022-04-18
Payer: COMMERCIAL

## 2022-04-18 DIAGNOSIS — E66.01 MORBID OBESITY (H): Primary | ICD-10-CM
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
## 2022-04-19 ENCOUNTER — VIRTUAL VISIT (OUTPATIENT)
Dept: SURGERY | Facility: CLINIC | Age: 32
End: 2022-04-19
Payer: COMMERCIAL

## 2022-04-19 VITALS — HEIGHT: 63 IN | WEIGHT: 205 LBS | BODY MASS INDEX: 36.32 KG/M2

## 2022-04-19 DIAGNOSIS — E66.812 CLASS 2 OBESITY WITH BODY MASS INDEX (BMI) OF 36.0 TO 36.9 IN ADULT, UNSPECIFIED OBESITY TYPE, UNSPECIFIED WHETHER SERIOUS COMORBIDITY PRESENT: ICD-10-CM

## 2022-04-19 DIAGNOSIS — K91.2 POSTOPERATIVE MALABSORPTION: Primary | ICD-10-CM

## 2022-04-19 PROCEDURE — 99214 OFFICE O/P EST MOD 30 MIN: CPT | Mod: GT | Performed by: FAMILY MEDICINE

## 2022-04-19 RX ORDER — VALACYCLOVIR HYDROCHLORIDE 500 MG/1
TABLET, FILM COATED ORAL
COMMUNITY
Start: 2022-03-11
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
## 2022-04-19 NOTE — LETTER
4/19/2022         RE: Stefanie Cordova  [address]  [facility] [address]        Dear Colleague,    Thank you for referring your patient, Stefanie Cordova, to the [facility] SURGERY CLINIC AND BARIATRICS CARE [place]. Please see a copy of my visit note below.    Stefanie Cordova is 32 year old  female who presents for a billable video visit today.    How would you like to obtain your AVS? MyChart  If dropped from the video visit, the video invitation should be resent by: Send to e-mail at: [email or web address]  Will anyone else be joining your video visit? No      Video Start Time: 1:00    Are there any specific questions or needs that you would like addressed at your visit today? Revision      Bariatric Follow Up Visit with a History of Previous Bariatric Surgery     Date of visit: 4/19/2022  Physician: Lilo Altman MD, MD  Primary Care Provider:  No Ref-Primary, Physician  Stefanie Cordova   32 year old  female    Date of Surgery: 12/11/2018  Initial Weight: 279#  Initial BMI: 46.43  Today's Weight:   Wt Readings from Last 1 Encounters:   04/19/22 93 kg (205 lb)     Body mass index is 36.31 kg/m .      Assessment and Plan     Assessment: Stefanie is a 32 year old year old female who is 3.5 yrs s/p  Sleeve Gastrectomy with Dr. Veda Watts MERRY Cordova feels as if she has not achieved the goals she hoped to accomplish through bariatric surgery and weight loss.     Encounter Diagnoses   Name Primary?     Postoperative malabsorption Yes     Class 2 obesity with body mass index (BMI) of 36.0 to 36.9 in adult, unspecified obesity type, unspecified whether serious comorbidity present          Current Outpatient Medications:      busPIRone (BUSPAR) 5 MG tablet, , Disp: , Rfl:      childrens multivitamin with iron (FLINTSTONES COMPLETE) 10 MG CHEW, Take 1 tablet by mouth 2 times daily, Disp: 180 tablet, Rfl: 3     Cholecalciferol (VITAMIN D) 125 MCG (5000 UT) capsule, Take 1 capsule (5,000  "Units) by mouth daily, Disp: 90 capsule, Rfl: 3     cyanocobalamin (VITAMIN B-12) 1000 MCG SUBL sublingual tablet, Place 1 tablet (1,000 mcg) under the tongue daily, Disp: 90 tablet, Rfl: 3     famotidine (PEPCID) 20 MG tablet, , Disp: , Rfl:      naltrexone (DEPADE/REVIA) 50 MG tablet, Take 1/2 tablet with evening meal. May increase to 1 tab if needed, Disp: 90 tablet, Rfl: 3     phentermine (ADIPEX-P) 37.5 MG tablet, Take 0.5-1 tablets (18.75-37.5 mg) by mouth every morning (before breakfast), Disp: 90 tablet, Rfl: 1     semaglutide (OZEMPIC) 2 MG/1.5ML SOPN pen, Inject 0.25 mg Subcutaneous once a week for 28 days, THEN 0.5 mg once a week for 28 days., Disp: 3 mL, Rfl: 0     sertraline (ZOLOFT) 100 MG tablet, Take 1 tablet by mouth daily, Disp: , Rfl:      valACYclovir (VALTREX) 500 MG tablet, , Disp: , Rfl:     Plan:    No follow-ups on file.    Bariatric Surgery Review     Interim History/LifeChanges: 3# down from January.74# down overall from before her sleeve.    Patient Concerns: believes she is OK for surgery  Appetite (1-10): OK with phentermine and Naltrexone.  GERD: on Pepsid and maalox    Medication changes: none    Vitamin Intake:   B-12   SL   MVI  2/d [place]   Vitamin D  5,000   Calcium        Other                LABS: \"Reviewed  From Feb  Nausea yes if not eating before meds.  Vomiting no  Constipation no  Diarrhea no  Rashes no  Hair Loss yes  Calf tenderness no  Breathing difficulty no  Reactive Hypoglycemia no  Light Headedness no   Moods stable    12 point ROS as above and otherwise negative      Habits:  Alcohol: 0-2  Tobacco: no  Caffeine 2/mo  NSAIDS no  Exercise Routine: you tube,   3 meals/day B: protein shake or boiled eggs or protein oatmeal L:salad with fish or chicken, protein yogurts, Microwave meals with protein D: tacos S:stopped snacking  Protein first yes  60 grams/day  Water Separate from meals yes  Calorie Containing Beverages no  Restaurant eating/wk no  Sleeping well-6-8 " hours  Stress low  CPAP: NA  Contraception: TL  DEXA:not indicated for age <45    Social History     Social History     Socioeconomic History     Marital status:      Spouse name: Not on file     Number of children: Not on file     Years of education: Not on file     Highest education level: Not on file   Occupational History     Not on file   Tobacco Use     Smoking status: Former Smoker     Packs/day: 0.50     Quit date: 2017     Years since quittin.8     Smokeless tobacco: Never Used     Tobacco comment: Started smoking again in the last few months   Substance and Sexual Activity     Alcohol use: Yes     Alcohol/week: 0.0 - 2.0 standard drinks     Comment: rarely ER visit 2021 with NI ..26     Drug use: No     Frequency: 7.0 times per week     Types: Amphetamines     Comment: last use two days ago     Sexual activity: Yes     Partners: Male     Birth control/protection: Female Surgical, Surgical   Other Topics Concern     Not on file   Social History Narrative    Single. Has 2 children.      Social Determinants of Health     Financial Resource Strain: Not on file   Food Insecurity: Not on file   Transportation Needs: Not on file   Physical Activity: Not on file   Stress: Not on file   Social Connections: Not on file   Intimate Partner Violence: Not on file   Housing Stability: Not on file       Past Medical History     Past Medical History:   Diagnosis Date     Alcoholism (H)     had some treatment court ordered around age 16.     Depression     in her teens hospitalized around 17yo, wrist cutting.      Disease of thyroid gland     temporary meds during her pregnancy, now resolved as of her last check.     Obesity      Urinary incontinence     mild stress incontinence     Problem List     Patient Active Problem List   Diagnosis     Depression     Adjustment disorder with depressed mood     ASCUS of cervix with negative high risk HPV     Borderline personality disorder (H)     History of  "abnormal cervical Pap smear     Intermittent explosive disorder     Normal delivery at term     Thyroid dysfunction     Medications       Current Outpatient Medications:      busPIRone (BUSPAR) 5 MG tablet, , Disp: , Rfl:      childrens multivitamin with iron (FLINTSTONES COMPLETE) 10 MG CHEW, Take 1 tablet by mouth 2 times daily, Disp: 180 tablet, Rfl: 3     Cholecalciferol (VITAMIN D) 125 MCG (5000 UT) capsule, Take 1 capsule (5,000 Units) by mouth daily, Disp: 90 capsule, Rfl: 3     cyanocobalamin (VITAMIN B-12) 1000 MCG SUBL sublingual tablet, Place 1 tablet (1,000 mcg) under the tongue daily, Disp: 90 tablet, Rfl: 3     famotidine (PEPCID) 20 MG tablet, , Disp: , Rfl:      naltrexone (DEPADE/REVIA) 50 MG tablet, Take 1/2 tablet with evening meal. May increase to 1 tab if needed, Disp: 90 tablet, Rfl: 3     phentermine (ADIPEX-P) 37.5 MG tablet, Take 0.5-1 tablets (18.75-37.5 mg) by mouth every morning (before breakfast), Disp: 90 tablet, Rfl: 1     semaglutide (OZEMPIC) 2 MG/1.5ML SOPN pen, Inject 0.25 mg Subcutaneous once a week for 28 days, THEN 0.5 mg once a week for 28 days., Disp: 3 mL, Rfl: 0     sertraline (ZOLOFT) 100 MG tablet, Take 1 tablet by mouth daily, Disp: , Rfl:      valACYclovir (VALTREX) 500 MG tablet, , Disp: , Rfl:    Surgical History     Past Surgical History  She has a past surgical history that includes GYN surgery (03/19/2015); GI surgery (12/11/2018); tubal ligation; Tympanostomy Tube Placement (Bilateral); and Pr Lap, Raphael Restrict Proc, Longitudinal Gastrectomy (N/A, 12/11/2018).    Objective-Exam     Constitutional:  Ht 1.6 m (5' 3\")   Wt 93 kg (205 lb)   BMI 36.31 kg/m    General:  Pleasant and in no acute distress     Psychiatric: alert and oriented X3, mood and affect normal    Counseling     We reviewed the important post op bariatric recommendations:  -eating 3 meals daily  -eating protein first, getting >60gm protein daily  -eating slowly, chewing food " well  -avoiding/limiting calorie containing beverages  -drinking water 15-30 minutes before or after meals  -choosing wheat, not white with breads, crackers, pastas, andrew, bagels, tortillas, rice  -limiting restaurant or cafeteria eating to twice a week or less    We discussed the importance of restorative sleep and stress management in maintaining a healthy weight.  We discussed the National Weight Control Registry healthy weight maintenance strategies and ways to optimize metabolism.  We discussed the importance of physical activity including cardiovascular and strength training in maintaining a healthier weight.    We discussed the importance of life-long vitamin supplementation and life-long  follow-up.    Stefanie was reminded that, to avoid marginal ulcers she should avoid tobacco at all, alcohol in excess, caffeine in excess, and NSAIDS (unless indicated for cardioprotection or othewise and opposed by a PPI).    Lilo Altman MD, MD, [facility].  4/19/2022  1:16 PM  Total time spent on the date of this encounter doing: chart review, review of test results, patient visit, physical exam, education, counseling, developing plan of care, and documenting = 30 minutes.      Video-Visit Details    Type of service:  Video Visit    Video End Time (time video stopped): 1:30  Originating Location (pt. Location): Home    Distant Location (provider location):  [facility] SURGERY CLINIC AND BARIATRICS CARE [place]     Platform used for Video Visit: AmWell      Again, thank you for allowing me to participate in the care of your patient.        Sincerely,        Lilo Altman MD

## 2022-04-20 ENCOUNTER — TELEPHONE (OUTPATIENT)
Dept: SURGERY | Facility: CLINIC | Age: 32
End: 2022-04-20
Payer: COMMERCIAL

## 2022-04-20 NOTE — TELEPHONE ENCOUNTER
Prior Authorization Retail Medication Request    Medication/Dose: Semaglutide 0.25 mg injection weekly for 4 weeks with plans to ramp up as tolerated    Key: [id number]      Pharmacy Information (if different than what is on RX)  Name:  [facility]  Phone: [phone]

## 2022-04-22 ENCOUNTER — TELEPHONE (OUTPATIENT)
Dept: SURGERY | Facility: CLINIC | Age: 32
End: 2022-04-22
Payer: COMMERCIAL

## 2022-04-22 NOTE — TELEPHONE ENCOUNTER
Called pt to let her know that the PA was sent to the PA team on 4/20/2022 and we have not heard anything yet. Pt verbalized understanding.    Rani John RN, CBN  [facility]  P [phone]  F [phone]

## 2022-04-22 NOTE — TELEPHONE ENCOUNTER
Pt called and wanted to let the clinic know that the pharmacy is still waiting on a PA for the medication that was just given to her.    Please call [phone]

## 2022-04-25 NOTE — TELEPHONE ENCOUNTER
Central Prior Authorization Team   Phone: [phone]    PA Initiation    Medication: Ozempic (0.25 or 0.5 MG/DOSE) 2MG/1.5ML pen-injectors  Insurance Company: [employer] - Phone [phone] Fax [phone]  Pharmacy Filling the Rx: [facility] [address] [place] [address]  Filling Pharmacy Phone: [phone]  Filling Pharmacy Fax:    Start Date: 4/25/2022

## 2022-04-26 NOTE — TELEPHONE ENCOUNTER
PRIOR AUTHORIZATION DENIED    Medication: Ozempic (0.25 or 0.5 MG/DOSE) 2MG/1.5ML pen-injectors    Denial Date: 4/26/2022    Denial Rational:  Medication is only covered if being prescribed for Type 2 Diabetes.  It is not covered for any other diagnosis.        Appeal Information:  If provider would like to appeal please provide a letter of medical necessity and route back to PA team.

## 2022-05-23 ENCOUNTER — TELEPHONE (OUTPATIENT)
Dept: SURGERY | Facility: CLINIC | Age: 32
End: 2022-05-23
Payer: COMMERCIAL

## 2022-05-23 NOTE — TELEPHONE ENCOUNTER
Sent video link via text and attempted to call for video visit at 10:00a. Unable to leave a message at this time as patient's voicemail has not been set up.

## 2022-10-22 ENCOUNTER — HEALTH MAINTENANCE LETTER (OUTPATIENT)
Age: 32
End: 2022-10-22

## 2022-10-24 NOTE — PROGRESS NOTES
Here to discuss changing from the surgical to the non-surgical program.      Rani John RN, N  [facility]  P [phone]  F [phone]     Stage 2: Additional Anesthesia Volume In Cc: 0.5

## 2022-11-22 DIAGNOSIS — K91.2 POSTOPERATIVE MALABSORPTION: ICD-10-CM

## 2022-11-22 DIAGNOSIS — R63.2 HYPERPHAGIA: ICD-10-CM

## 2022-11-22 RX ORDER — PHENTERMINE HYDROCHLORIDE 37.5 MG/1
TABLET ORAL
Qty: 30 TABLET | Refills: 0 | Status: SHIPPED | OUTPATIENT
Start: 2022-11-22

## 2022-12-04 ENCOUNTER — HEALTH MAINTENANCE LETTER (OUTPATIENT)
Age: 32
End: 2022-12-04

## 2023-11-05 ENCOUNTER — HEALTH MAINTENANCE LETTER (OUTPATIENT)
Age: 33
End: 2023-11-05

## 2023-11-11 ENCOUNTER — HOSPITAL ENCOUNTER (EMERGENCY)
Facility: CLINIC | Age: 33
Discharge: HOME | End: 2023-11-11
Attending: EMERGENCY MEDICINE | Admitting: EMERGENCY MEDICINE
Payer: COMMERCIAL

## 2023-11-11 VITALS
HEART RATE: 87 BPM | SYSTOLIC BLOOD PRESSURE: 145 MMHG | DIASTOLIC BLOOD PRESSURE: 56 MMHG | TEMPERATURE: 97.6 F | BODY MASS INDEX: 36.31 KG/M2 | WEIGHT: 205 LBS | OXYGEN SATURATION: 100 % | RESPIRATION RATE: 16 BRPM

## 2023-11-11 DIAGNOSIS — N10 PYELONEPHRITIS, ACUTE: ICD-10-CM

## 2023-11-11 DIAGNOSIS — R30.0 DYSURIA: ICD-10-CM

## 2023-11-11 DIAGNOSIS — T74.21XA SEXUAL ASSAULT OF ADULT, INITIAL ENCOUNTER: ICD-10-CM

## 2023-11-11 DIAGNOSIS — R10.9 FLANK PAIN: ICD-10-CM

## 2023-11-11 LAB
ALBUMIN SERPL BCG-MCNC: 4 G/DL (ref 3.5–5.2)
ALBUMIN UR-MCNC: 50 MG/DL
ALP SERPL-CCNC: 75 U/L (ref 35–104)
ALT SERPL W P-5'-P-CCNC: 17 U/L (ref 0–50)
ANION GAP SERPL CALCULATED.3IONS-SCNC: 7 MMOL/L (ref 7–15)
APPEARANCE UR: ABNORMAL
AST SERPL W P-5'-P-CCNC: 21 U/L (ref 0–45)
BACTERIA #/AREA URNS HPF: ABNORMAL /HPF
BILIRUB SERPL-MCNC: 0.4 MG/DL
BILIRUB UR QL STRIP: NEGATIVE
BUN SERPL-MCNC: 10 MG/DL (ref 6–20)
CALCIUM SERPL-MCNC: 8.9 MG/DL (ref 8.6–10)
CHLORIDE SERPL-SCNC: 104 MMOL/L (ref 98–107)
COLOR UR AUTO: YELLOW
CREAT SERPL-MCNC: 0.97 MG/DL (ref 0.51–0.95)
DEPRECATED HCO3 PLAS-SCNC: 26 MMOL/L (ref 22–29)
EGFRCR SERPLBLD CKD-EPI 2021: 79 ML/MIN/1.73M2
GLUCOSE SERPL-MCNC: 92 MG/DL (ref 70–99)
GLUCOSE UR STRIP-MCNC: NEGATIVE MG/DL
HCG UR QL: NEGATIVE
HGB UR QL STRIP: ABNORMAL
KETONES UR STRIP-MCNC: NEGATIVE MG/DL
LEUKOCYTE ESTERASE UR QL STRIP: ABNORMAL
LIPASE SERPL-CCNC: 18 U/L (ref 13–60)
MAGNESIUM SERPL-MCNC: 1.9 MG/DL (ref 1.7–2.3)
MUCOUS THREADS #/AREA URNS LPF: PRESENT /LPF
NITRATE UR QL: POSITIVE
PH UR STRIP: 5.5 [PH] (ref 5–7)
POTASSIUM SERPL-SCNC: 3.9 MMOL/L (ref 3.4–5.3)
PROT SERPL-MCNC: 6.7 G/DL (ref 6.4–8.3)
RBC URINE: 6 /HPF
SODIUM SERPL-SCNC: 137 MMOL/L (ref 135–145)
SP GR UR STRIP: 1.02 (ref 1–1.03)
UROBILINOGEN UR STRIP-MCNC: NORMAL MG/DL
WBC CLUMPS #/AREA URNS HPF: PRESENT /HPF
WBC URINE: >182 /HPF

## 2023-11-11 PROCEDURE — 87491 CHLMYD TRACH DNA AMP PROBE: CPT | Performed by: EMERGENCY MEDICINE

## 2023-11-11 PROCEDURE — 83735 ASSAY OF MAGNESIUM: CPT | Performed by: EMERGENCY MEDICINE

## 2023-11-11 PROCEDURE — 96365 THER/PROPH/DIAG IV INF INIT: CPT | Performed by: EMERGENCY MEDICINE

## 2023-11-11 PROCEDURE — 258N000003 HC RX IP 258 OP 636: Performed by: EMERGENCY MEDICINE

## 2023-11-11 PROCEDURE — 250N000013 HC RX MED GY IP 250 OP 250 PS 637: Performed by: EMERGENCY MEDICINE

## 2023-11-11 PROCEDURE — 99284 EMERGENCY DEPT VISIT MOD MDM: CPT | Mod: 25 | Performed by: EMERGENCY MEDICINE

## 2023-11-11 PROCEDURE — 96366 THER/PROPH/DIAG IV INF ADDON: CPT | Performed by: EMERGENCY MEDICINE

## 2023-11-11 PROCEDURE — 81025 URINE PREGNANCY TEST: CPT | Performed by: EMERGENCY MEDICINE

## 2023-11-11 PROCEDURE — 250N000011 HC RX IP 250 OP 636: Mod: JZ | Performed by: EMERGENCY MEDICINE

## 2023-11-11 PROCEDURE — 81001 URINALYSIS AUTO W/SCOPE: CPT | Performed by: EMERGENCY MEDICINE

## 2023-11-11 PROCEDURE — 96375 TX/PRO/DX INJ NEW DRUG ADDON: CPT | Performed by: EMERGENCY MEDICINE

## 2023-11-11 PROCEDURE — 83690 ASSAY OF LIPASE: CPT | Performed by: EMERGENCY MEDICINE

## 2023-11-11 PROCEDURE — 99285 EMERGENCY DEPT VISIT HI MDM: CPT | Performed by: EMERGENCY MEDICINE

## 2023-11-11 PROCEDURE — 87591 N.GONORRHOEAE DNA AMP PROB: CPT | Performed by: EMERGENCY MEDICINE

## 2023-11-11 PROCEDURE — 80053 COMPREHEN METABOLIC PANEL: CPT | Performed by: EMERGENCY MEDICINE

## 2023-11-11 PROCEDURE — 87389 HIV-1 AG W/HIV-1&-2 AB AG IA: CPT | Performed by: EMERGENCY MEDICINE

## 2023-11-11 PROCEDURE — 36415 COLL VENOUS BLD VENIPUNCTURE: CPT | Performed by: EMERGENCY MEDICINE

## 2023-11-11 PROCEDURE — 87086 URINE CULTURE/COLONY COUNT: CPT | Performed by: EMERGENCY MEDICINE

## 2023-11-11 PROCEDURE — 96361 HYDRATE IV INFUSION ADD-ON: CPT | Performed by: EMERGENCY MEDICINE

## 2023-11-11 RX ORDER — EMTRICITABINE AND TENOFOVIR DISOPROXIL FUMARATE 200; 300 MG/1; MG/1
1 TABLET, FILM COATED ORAL DAILY
Qty: 28 TABLET | Refills: 0 | Status: SHIPPED | OUTPATIENT
Start: 2023-11-11 | End: 2023-12-09

## 2023-11-11 RX ORDER — MORPHINE SULFATE 4 MG/ML
4 INJECTION, SOLUTION INTRAMUSCULAR; INTRAVENOUS ONCE
Status: COMPLETED | OUTPATIENT
Start: 2023-11-11 | End: 2023-11-11

## 2023-11-11 RX ORDER — DOXYCYCLINE 100 MG/1
100 CAPSULE ORAL ONCE
Status: COMPLETED | OUTPATIENT
Start: 2023-11-11 | End: 2023-11-11

## 2023-11-11 RX ORDER — ACETAMINOPHEN 325 MG/1
975 TABLET ORAL ONCE
Status: COMPLETED | OUTPATIENT
Start: 2023-11-11 | End: 2023-11-11

## 2023-11-11 RX ORDER — CIPROFLOXACIN 500 MG/1
500 TABLET, FILM COATED ORAL 2 TIMES DAILY
Qty: 20 TABLET | Refills: 0 | Status: SHIPPED | OUTPATIENT
Start: 2023-11-11 | End: 2023-11-21

## 2023-11-11 RX ORDER — CEFTRIAXONE 1 G/1
1 INJECTION, POWDER, FOR SOLUTION INTRAMUSCULAR; INTRAVENOUS ONCE
Status: COMPLETED | OUTPATIENT
Start: 2023-11-11 | End: 2023-11-11

## 2023-11-11 RX ORDER — KETOROLAC TROMETHAMINE 15 MG/ML
10 INJECTION, SOLUTION INTRAMUSCULAR; INTRAVENOUS ONCE
Status: COMPLETED | OUTPATIENT
Start: 2023-11-11 | End: 2023-11-11

## 2023-11-11 RX ORDER — METOCLOPRAMIDE HYDROCHLORIDE 5 MG/ML
10 INJECTION INTRAMUSCULAR; INTRAVENOUS ONCE
Status: COMPLETED | OUTPATIENT
Start: 2023-11-11 | End: 2023-11-11

## 2023-11-11 RX ORDER — METRONIDAZOLE 500 MG/1
500 TABLET ORAL ONCE
Status: COMPLETED | OUTPATIENT
Start: 2023-11-11 | End: 2023-11-11

## 2023-11-11 RX ORDER — EMTRICITABINE AND TENOFOVIR DISOPROXIL FUMARATE 200; 300 MG/1; MG/1
1 TABLET, FILM COATED ORAL ONCE
Status: COMPLETED | OUTPATIENT
Start: 2023-11-11 | End: 2023-11-11

## 2023-11-11 RX ORDER — METRONIDAZOLE 500 MG/1
500 TABLET ORAL 2 TIMES DAILY
Qty: 14 TABLET | Refills: 0 | Status: SHIPPED | OUTPATIENT
Start: 2023-11-11 | End: 2023-11-18

## 2023-11-11 RX ORDER — DOXYCYCLINE 100 MG/1
100 CAPSULE ORAL 2 TIMES DAILY
Qty: 28 CAPSULE | Refills: 0 | Status: SHIPPED | OUTPATIENT
Start: 2023-11-11 | End: 2023-11-25

## 2023-11-11 RX ORDER — ONDANSETRON 4 MG/1
4 TABLET, ORALLY DISINTEGRATING ORAL EVERY 8 HOURS PRN
Qty: 30 TABLET | Refills: 0 | Status: SHIPPED | OUTPATIENT
Start: 2023-11-11 | End: 2023-11-21

## 2023-11-11 RX ADMIN — DOXYCYCLINE HYCLATE 100 MG: 100 CAPSULE ORAL at 20:44

## 2023-11-11 RX ADMIN — SODIUM CHLORIDE, POTASSIUM CHLORIDE, SODIUM LACTATE AND CALCIUM CHLORIDE 1000 ML: 600; 310; 30; 20 INJECTION, SOLUTION INTRAVENOUS at 17:49

## 2023-11-11 RX ADMIN — CEFTRIAXONE SODIUM 1 G: 1 INJECTION, POWDER, FOR SOLUTION INTRAMUSCULAR; INTRAVENOUS at 18:55

## 2023-11-11 RX ADMIN — METRONIDAZOLE 500 MG: 500 TABLET ORAL at 20:44

## 2023-11-11 RX ADMIN — KETOROLAC TROMETHAMINE 10 MG: 15 INJECTION, SOLUTION INTRAMUSCULAR; INTRAVENOUS at 17:49

## 2023-11-11 RX ADMIN — METOCLOPRAMIDE HYDROCHLORIDE 10 MG: 5 INJECTION INTRAMUSCULAR; INTRAVENOUS at 17:47

## 2023-11-11 RX ADMIN — ACETAMINOPHEN 975 MG: 325 TABLET, FILM COATED ORAL at 17:52

## 2023-11-11 RX ADMIN — MORPHINE SULFATE 4 MG: 4 INJECTION INTRAVENOUS at 18:57

## 2023-11-11 RX ADMIN — EMTRICITABINE AND TENOFOVIR DISOPROXIL FUMARATE 1 TABLET: 200; 300 TABLET, FILM COATED ORAL at 20:44

## 2023-11-11 ASSESSMENT — ACTIVITIES OF DAILY LIVING (ADL)
ADLS_ACUITY_SCORE: 35
ADLS_ACUITY_SCORE: 33

## 2023-11-11 NOTE — ED TRIAGE NOTES
Triage Assessment (Adult)       Row Name 11/11/23 1619          Triage Assessment    Airway WDL WDL        Respiratory WDL    Respiratory WDL WDL        Skin Circulation/Temperature WDL    Skin Circulation/Temperature WDL WDL        Cardiac WDL    Cardiac WDL WDL
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
## 2023-11-12 LAB
BACTERIA UR CULT: ABNORMAL
C TRACH DNA SPEC QL NAA+PROBE: NEGATIVE
HIV 1+2 AB+HIV1 P24 AG SERPL QL IA: NONREACTIVE
N GONORRHOEA DNA SPEC QL NAA+PROBE: NEGATIVE

## 2023-11-12 NOTE — DISCHARGE INSTRUCTIONS
Take antibiotics as prescribed - for urine/kidney infection as well as for potential sexually transmitted infection  Please follow-up your sexually transmitted infection results on the [employer] rhina or you may call for results  Follow-up with your primary care doctor --call on Monday for the next available appointment ideally within 1 week for follow-up

## 2023-11-14 ENCOUNTER — NURSE TRIAGE (OUTPATIENT)
Dept: NURSING | Facility: CLINIC | Age: 33
End: 2023-11-14
Payer: COMMERCIAL

## 2023-11-14 NOTE — TELEPHONE ENCOUNTER
Patient is calling for lab results onlly.  FNA relayed lab results.      Reason for Disposition   Health Information question, no triage required and triager able to answer question    Additional Information   Negative: RN needs further essential information from caller in order to complete triage   Negative: Requesting regular office appointment   Negative: [1] Caller requesting NON-URGENT health information AND [2] PCP's office is the best resource    Protocols used: Information Only Call - No Triage-A-

## 2024-11-07 ENCOUNTER — HOSPITAL ENCOUNTER (EMERGENCY)
Facility: CLINIC | Age: 34
Discharge: HOME | End: 2024-11-07
Attending: EMERGENCY MEDICINE | Admitting: EMERGENCY MEDICINE
Payer: COMMERCIAL

## 2024-11-07 VITALS
TEMPERATURE: 97.2 F | RESPIRATION RATE: 24 BRPM | DIASTOLIC BLOOD PRESSURE: 93 MMHG | HEART RATE: 84 BPM | OXYGEN SATURATION: 96 % | SYSTOLIC BLOOD PRESSURE: 157 MMHG

## 2024-11-07 VITALS
TEMPERATURE: 97 F | HEART RATE: 82 BPM | DIASTOLIC BLOOD PRESSURE: 69 MMHG | SYSTOLIC BLOOD PRESSURE: 129 MMHG | RESPIRATION RATE: 20 BRPM | OXYGEN SATURATION: 99 %

## 2024-11-07 DIAGNOSIS — G47.00 INSOMNIA, UNSPECIFIED TYPE: ICD-10-CM

## 2024-11-07 DIAGNOSIS — F23 ACUTE PSYCHOSIS (H): ICD-10-CM

## 2024-11-07 DIAGNOSIS — F41.9 ANXIETY: ICD-10-CM

## 2024-11-07 PROBLEM — F43.10 PTSD (POST-TRAUMATIC STRESS DISORDER): Status: ACTIVE | Noted: 2024-11-07

## 2024-11-07 LAB
AMPHETAMINES UR QL SCN: ABNORMAL
BARBITURATES UR QL SCN: ABNORMAL
BENZODIAZ UR QL SCN: ABNORMAL
BZE UR QL SCN: ABNORMAL
CANNABINOIDS UR QL SCN: ABNORMAL
FENTANYL UR QL: ABNORMAL
HCG UR QL: NEGATIVE
OPIATES UR QL SCN: ABNORMAL
PCP QUAL URINE (ROCHE): ABNORMAL
SARS-COV-2 RNA RESP QL NAA+PROBE: NEGATIVE

## 2024-11-07 PROCEDURE — 99284 EMERGENCY DEPT VISIT MOD MDM: CPT

## 2024-11-07 PROCEDURE — 81025 URINE PREGNANCY TEST: CPT | Performed by: EMERGENCY MEDICINE

## 2024-11-07 PROCEDURE — 250N000013 HC RX MED GY IP 250 OP 250 PS 637: Performed by: EMERGENCY MEDICINE

## 2024-11-07 PROCEDURE — 80307 DRUG TEST PRSMV CHEM ANLYZR: CPT | Performed by: EMERGENCY MEDICINE

## 2024-11-07 PROCEDURE — 99282 EMERGENCY DEPT VISIT SF MDM: CPT

## 2024-11-07 PROCEDURE — 87635 SARS-COV-2 COVID-19 AMP PRB: CPT | Performed by: EMERGENCY MEDICINE

## 2024-11-07 RX ORDER — OLANZAPINE 5 MG/1
5 TABLET ORAL AT BEDTIME
Status: DISCONTINUED | OUTPATIENT
Start: 2024-11-07 | End: 2024-11-07

## 2024-11-07 RX ORDER — OLANZAPINE 5 MG/1
5 TABLET ORAL ONCE
Status: COMPLETED | OUTPATIENT
Start: 2024-11-07 | End: 2024-11-07

## 2024-11-07 RX ORDER — OLANZAPINE 10 MG/1
10 TABLET, ORALLY DISINTEGRATING ORAL ONCE
Status: COMPLETED | OUTPATIENT
Start: 2024-11-07 | End: 2024-11-07

## 2024-11-07 RX ORDER — OLANZAPINE 10 MG/1
10 TABLET, ORALLY DISINTEGRATING ORAL
Status: COMPLETED | OUTPATIENT
Start: 2024-11-07 | End: 2024-11-07

## 2024-11-07 RX ADMIN — OLANZAPINE 5 MG: 5 TABLET, FILM COATED ORAL at 19:51

## 2024-11-07 RX ADMIN — OLANZAPINE 10 MG: 10 TABLET, ORALLY DISINTEGRATING ORAL at 15:08

## 2024-11-07 ASSESSMENT — ACTIVITIES OF DAILY LIVING (ADL)
ADLS_ACUITY_SCORE: 0

## 2024-11-07 ASSESSMENT — COLUMBIA-SUICIDE SEVERITY RATING SCALE - C-SSRS: IS THE PATIENT NOT ABLE TO COMPLETE C-SSRS: REFUSES TO ANSWER
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
## 2024-11-07 NOTE — ED NOTES
"Pt father called;  RN states that no information can be given d/t pt not wanting me to speak with him.  Father continued to talk stating, \"she needs more than sleeping meds and to be sent home and that's what keeps happening.\"  Alli Cordova [phone].  Pt came out of her room argumentative and then walked out of her room to the lobby.  MD updated.  "

## 2024-11-07 NOTE — ED TRIAGE NOTES
"Patient left AMA less than 2 hours ago and brought back to ED by family for manic behavior. Patient states \"I do not know who I am, I am not sure why I am here\". Argumentative and confrontational towards to staff, gave a urine specimen cup filled with tap water for urine sample.      Triage Assessment (Adult)       Row Name 11/07/24 1454          Triage Assessment    Airway WDL WDL        Respiratory WDL    Respiratory WDL WDL        Skin Circulation/Temperature WDL    Skin Circulation/Temperature WDL WDL        Cardiac WDL    Cardiac WDL WDL        Peripheral/Neurovascular WDL    Peripheral Neurovascular WDL WDL        Cognitive/Neuro/Behavioral WDL    Cognitive/Neuro/Behavioral WDL WDL                     "
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
## 2024-11-07 NOTE — ED TRIAGE NOTES
"Pt presents today saying \"I don't know anymore, I just don't feel right\". Pt reports feeling like something might be wrong such as a UTI, does not give specific symptoms. She presents with boyfriend who says that a Mental Health  will be coming to talk with us. She was seen twice this week for similar symptoms and diagnosed with anxiety. Pt believes that she needs blood work. Pt began crying in triage and holding her her ears closed. She says \"I don't want them anymore\", saying she doesn't want her memories anymore.     Pt saying she is having flashbacks.     When asking have you had thoughts of wanting to end your life, she says \"yes, but I wouldn't act on it\".     "

## 2024-11-08 ENCOUNTER — TELEPHONE (OUTPATIENT)
Dept: BEHAVIORAL HEALTH | Facility: CLINIC | Age: 34
End: 2024-11-08
Payer: COMMERCIAL

## 2024-11-08 NOTE — ED NOTES
Pt was given a list of all the phone numbers we have in the chart so she can make arrangements to get home, pt verbalized that she was able to find a safe ride home, pt discharged to triage and shown where the phone she could use is, all belongings given back to pt.
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
## 2024-11-08 NOTE — ED NOTES
Taxrupinder arrives at ED to transport pt home, pt was not in triage or bathroom, name called out multiple times, triage staff noted pt left building, taxi sent away, pt is able to care for herself on discharge.

## 2024-11-08 NOTE — TELEPHONE ENCOUNTER
Unable to reach patient. Mailbox was full. No other phone number for patient was available in [facility] at time of call.  No further follow-up is needed.

## 2024-11-08 NOTE — ED PROVIDER NOTES
This patient's care was signed out to me by Dr. Salazar pending DEC evaluation.  [place]ton from DEC evaluated the patient.  The patient declined inpatient psychiatric care and also declined empath.  Dayton does not feel that the patient meets criteria for being placed on 72-hour hold.  She feels that the patient can be safely discharged home with outpatient resources which have also been arranged when she was evaluated at [facility] for similar symptoms.  I spoke with the patient and she was denying suicidal ideations, homicidal ideations.  She is tracking well and appears calm.  I did not feel that she meets criteria for being placed on a 72-hour hold and being kept against her will.  Plan is that she will be discharged home with her mother.     Becky Bangura MD  11/07/24 2010

## 2024-11-08 NOTE — ED NOTES
Taxi ride set up for pt from triage, pt verbalizes ability to safely get home with taxi, home address per pt   [id number] [facility] ([place]

## 2024-11-08 NOTE — ED NOTES
Patient received a phone call from her boyfriend. Patient escalated with him and hung up on him. Delivered the patients meal tray and the patient refused to eat her dinner. she c/o not being hungry. The tray was left in her room incase she changes her mind.

## 2025-01-26 ENCOUNTER — HEALTH MAINTENANCE LETTER (OUTPATIENT)
Age: 35
End: 2025-01-26